# Patient Record
Sex: MALE | Race: WHITE | NOT HISPANIC OR LATINO | Employment: OTHER | URBAN - NONMETROPOLITAN AREA
[De-identification: names, ages, dates, MRNs, and addresses within clinical notes are randomized per-mention and may not be internally consistent; named-entity substitution may affect disease eponyms.]

---

## 2019-10-31 ENCOUNTER — HISTORICAL (OUTPATIENT)
Dept: ADMINISTRATIVE | Facility: HOSPITAL | Age: 64
End: 2019-10-31

## 2020-05-20 ENCOUNTER — HISTORICAL (OUTPATIENT)
Dept: ADMINISTRATIVE | Facility: HOSPITAL | Age: 65
End: 2020-05-20

## 2020-06-09 ENCOUNTER — HISTORICAL (OUTPATIENT)
Dept: ADMINISTRATIVE | Facility: HOSPITAL | Age: 65
End: 2020-06-09

## 2020-12-14 ENCOUNTER — HISTORICAL (OUTPATIENT)
Dept: RADIOLOGY | Facility: HOSPITAL | Age: 65
End: 2020-12-14

## 2020-12-14 LAB
ABS NEUT (OLG): 3.6 X10(3)/MCL (ref 1.5–6.9)
ALBUMIN SERPL-MCNC: 4.3 GM/DL (ref 3.4–4.8)
ALBUMIN/GLOB SERPL: 1.1 RATIO (ref 1.1–2)
ALP SERPL-CCNC: 87 UNIT/L (ref 40–150)
ALT SERPL-CCNC: 16 UNIT/L (ref 0–55)
AST SERPL-CCNC: 22 UNIT/L (ref 5–34)
BASOPHILS # BLD AUTO: 0.1 X10(3)/MCL (ref 0–0.1)
BASOPHILS NFR BLD AUTO: 1 % (ref 0–1)
BILIRUB SERPL-MCNC: 0.5 MG/DL
BILIRUBIN DIRECT+TOT PNL SERPL-MCNC: 0.2 MG/DL (ref 0–0.8)
BILIRUBIN DIRECT+TOT PNL SERPL-MCNC: 0.3 MG/DL (ref 0–0.5)
BUN SERPL-MCNC: 22 MG/DL (ref 8.4–25.7)
CALCIUM SERPL-MCNC: 9.8 MG/DL (ref 8.8–10)
CEA SERPL-MCNC: 1.94 NG/ML (ref 0–3)
CHLORIDE SERPL-SCNC: 101 MMOL/L (ref 98–107)
CO2 SERPL-SCNC: 27 MMOL/L (ref 23–31)
CREAT SERPL-MCNC: 1.51 MG/DL (ref 0.73–1.18)
EOSINOPHIL # BLD AUTO: 0.6 X10(3)/MCL (ref 0–0.6)
EOSINOPHIL NFR BLD AUTO: 9 % (ref 0–5)
ERYTHROCYTE [DISTWIDTH] IN BLOOD BY AUTOMATED COUNT: 13.9 % (ref 11.5–17)
GLOBULIN SER-MCNC: 3.9 GM/DL (ref 2.4–3.5)
GLUCOSE SERPL-MCNC: 153 MG/DL (ref 82–115)
HCT VFR BLD AUTO: 45.4 % (ref 42–52)
HGB BLD-MCNC: 14.6 GM/DL (ref 14–18)
IMM GRANULOCYTES # BLD AUTO: 0.07 10*3/UL (ref 0–0.02)
IMM GRANULOCYTES NFR BLD AUTO: 1.1 % (ref 0–0.43)
LYMPHOCYTES # BLD AUTO: 1.4 X10(3)/MCL (ref 0.5–4.1)
LYMPHOCYTES NFR BLD AUTO: 22 % (ref 15–40)
MCH RBC QN AUTO: 28 PG (ref 27–34)
MCHC RBC AUTO-ENTMCNC: 32 GM/DL (ref 31–36)
MCV RBC AUTO: 86 FL (ref 80–99)
MONOCYTES # BLD AUTO: 0.5 X10(3)/MCL (ref 0–1.1)
MONOCYTES NFR BLD AUTO: 8 % (ref 4–12)
NEUTROPHILS # BLD AUTO: 3.6 X10(3)/MCL (ref 1.5–6.9)
NEUTROPHILS NFR BLD AUTO: 59 % (ref 43–75)
PLATELET # BLD AUTO: 162 X10(3)/MCL (ref 140–400)
PMV BLD AUTO: 10.2 FL (ref 6.8–10)
POC CREATININE: 1.6 MG/DL (ref 0.6–1.3)
POTASSIUM SERPL-SCNC: 4.3 MMOL/L (ref 3.5–5.1)
PROT SERPL-MCNC: 8.2 GM/DL (ref 5.8–7.6)
RBC # BLD AUTO: 5.27 X10(6)/MCL (ref 4.7–6.1)
SODIUM SERPL-SCNC: 141 MMOL/L (ref 136–145)
WBC # SPEC AUTO: 6.1 X10(3)/MCL (ref 4.5–11.5)

## 2020-12-22 ENCOUNTER — HISTORICAL (OUTPATIENT)
Dept: RADIOLOGY | Facility: HOSPITAL | Age: 65
End: 2020-12-22

## 2021-01-07 LAB — CRC RECOMMENDATION EXT: NORMAL

## 2021-04-15 ENCOUNTER — HISTORICAL (OUTPATIENT)
Dept: ADMINISTRATIVE | Facility: HOSPITAL | Age: 66
End: 2021-04-15

## 2021-06-21 ENCOUNTER — HISTORICAL (OUTPATIENT)
Dept: LAB | Facility: HOSPITAL | Age: 66
End: 2021-06-21

## 2021-06-21 LAB
ABS NEUT (OLG): 3.6 X10(3)/MCL (ref 1.5–6.9)
ALBUMIN SERPL-MCNC: 3.8 GM/DL (ref 3.4–4.8)
ALBUMIN/GLOB SERPL: 1.1 RATIO (ref 1.1–2)
ALP SERPL-CCNC: 64 UNIT/L (ref 40–150)
ALT SERPL-CCNC: 7 UNIT/L (ref 0–55)
AST SERPL-CCNC: 23 UNIT/L (ref 5–34)
BASOPHILS # BLD AUTO: 0.1 X10(3)/MCL (ref 0–0.1)
BASOPHILS NFR BLD AUTO: 1 % (ref 0–1)
BILIRUB SERPL-MCNC: 0.4 MG/DL
BILIRUBIN DIRECT+TOT PNL SERPL-MCNC: 0.2 MG/DL (ref 0–0.5)
BILIRUBIN DIRECT+TOT PNL SERPL-MCNC: 0.2 MG/DL (ref 0–0.8)
BUN SERPL-MCNC: 19 MG/DL (ref 8.4–25.7)
CALCIUM SERPL-MCNC: 9.3 MG/DL (ref 8.8–10)
CEA SERPL-MCNC: 2.8 NG/ML (ref 0–3)
CHLORIDE SERPL-SCNC: 107 MMOL/L (ref 98–107)
CO2 SERPL-SCNC: 26 MMOL/L (ref 23–31)
CREAT SERPL-MCNC: 1.49 MG/DL (ref 0.73–1.18)
EOSINOPHIL # BLD AUTO: 0.5 X10(3)/MCL (ref 0–0.6)
EOSINOPHIL NFR BLD AUTO: 8 % (ref 0–5)
ERYTHROCYTE [DISTWIDTH] IN BLOOD BY AUTOMATED COUNT: 13.9 % (ref 11.5–17)
GLOBULIN SER-MCNC: 3.6 GM/DL (ref 2.4–3.5)
GLUCOSE SERPL-MCNC: 126 MG/DL (ref 82–115)
HCT VFR BLD AUTO: 41.5 % (ref 42–52)
HGB BLD-MCNC: 13.7 GM/DL (ref 14–18)
IMM GRANULOCYTES # BLD AUTO: 0.06 10*3/UL (ref 0–0.02)
IMM GRANULOCYTES NFR BLD AUTO: 1 % (ref 0–0.43)
LYMPHOCYTES # BLD AUTO: 1.4 X10(3)/MCL (ref 0.5–4.1)
LYMPHOCYTES NFR BLD AUTO: 23 % (ref 15–40)
MCH RBC QN AUTO: 28 PG (ref 27–34)
MCHC RBC AUTO-ENTMCNC: 33 GM/DL (ref 31–36)
MCV RBC AUTO: 86 FL (ref 80–99)
MONOCYTES # BLD AUTO: 0.5 X10(3)/MCL (ref 0–1.1)
MONOCYTES NFR BLD AUTO: 8 % (ref 4–12)
NEUTROPHILS # BLD AUTO: 3.6 X10(3)/MCL (ref 1.5–6.9)
NEUTROPHILS NFR BLD AUTO: 58 % (ref 43–75)
PLATELET # BLD AUTO: 167 X10(3)/MCL (ref 140–400)
PMV BLD AUTO: 9.6 FL (ref 6.8–10)
POTASSIUM SERPL-SCNC: 4.9 MMOL/L (ref 3.5–5.1)
PROT SERPL-MCNC: 7.4 GM/DL (ref 5.8–7.6)
RBC # BLD AUTO: 4.84 X10(6)/MCL (ref 4.7–6.1)
SODIUM SERPL-SCNC: 141 MMOL/L (ref 136–145)
WBC # SPEC AUTO: 6.2 X10(3)/MCL (ref 4.5–11.5)

## 2021-06-22 ENCOUNTER — HISTORICAL (OUTPATIENT)
Dept: RADIOLOGY | Facility: HOSPITAL | Age: 66
End: 2021-06-22

## 2021-10-21 ENCOUNTER — HISTORICAL (OUTPATIENT)
Dept: LAB | Facility: HOSPITAL | Age: 66
End: 2021-10-21

## 2021-10-21 LAB
ABS NEUT (OLG): 2.8 X10(3)/MCL (ref 1.5–6.9)
ALBUMIN SERPL-MCNC: 3.9 GM/DL (ref 3.4–4.8)
ALBUMIN/GLOB SERPL: 1.3 RATIO (ref 1.1–2)
ALP SERPL-CCNC: 57 UNIT/L (ref 40–150)
ALT SERPL-CCNC: 10 UNIT/L (ref 0–55)
AST SERPL-CCNC: 18 UNIT/L (ref 5–34)
BASOPHILS # BLD AUTO: 0 X10(3)/MCL (ref 0–0.1)
BASOPHILS NFR BLD AUTO: 1 % (ref 0–1)
BILIRUB SERPL-MCNC: 0.5 MG/DL
BILIRUBIN DIRECT+TOT PNL SERPL-MCNC: 0.2 MG/DL (ref 0–0.5)
BILIRUBIN DIRECT+TOT PNL SERPL-MCNC: 0.3 MG/DL (ref 0–0.8)
BUN SERPL-MCNC: 23 MG/DL (ref 8.4–25.7)
CALCIUM SERPL-MCNC: 9.5 MG/DL (ref 8.8–10)
CEA SERPL-MCNC: 2.68 NG/ML (ref 0–3)
CHLORIDE SERPL-SCNC: 105 MMOL/L (ref 98–107)
CO2 SERPL-SCNC: 27 MMOL/L (ref 23–31)
CREAT SERPL-MCNC: 1.53 MG/DL (ref 0.73–1.18)
EOSINOPHIL # BLD AUTO: 0.4 X10(3)/MCL (ref 0–0.6)
EOSINOPHIL NFR BLD AUTO: 7 % (ref 0–5)
ERYTHROCYTE [DISTWIDTH] IN BLOOD BY AUTOMATED COUNT: 14.1 % (ref 11.5–17)
FERRITIN SERPL-MCNC: 118.1 NG/ML (ref 21.81–274.66)
GLOBULIN SER-MCNC: 3.1 GM/DL (ref 2.4–3.5)
GLUCOSE SERPL-MCNC: 128 MG/DL (ref 82–115)
HCT VFR BLD AUTO: 40.9 % (ref 42–52)
HGB BLD-MCNC: 13.1 GM/DL (ref 14–18)
IMM GRANULOCYTES # BLD AUTO: 0.05 10*3/UL (ref 0–0.02)
IMM GRANULOCYTES NFR BLD AUTO: 1 % (ref 0–0.43)
IRON SATN MFR SERPL: 26 % (ref 20–50)
IRON SERPL-MCNC: 61 UG/DL (ref 65–175)
LYMPHOCYTES # BLD AUTO: 1.3 X10(3)/MCL (ref 0.5–4.1)
LYMPHOCYTES NFR BLD AUTO: 26 % (ref 15–40)
MCH RBC QN AUTO: 28 PG (ref 27–34)
MCHC RBC AUTO-ENTMCNC: 32 GM/DL (ref 31–36)
MCV RBC AUTO: 88 FL (ref 80–99)
MONOCYTES # BLD AUTO: 0.5 X10(3)/MCL (ref 0–1.1)
MONOCYTES NFR BLD AUTO: 9 % (ref 4–12)
NEUTROPHILS # BLD AUTO: 2.8 X10(3)/MCL (ref 1.5–6.9)
NEUTROPHILS NFR BLD AUTO: 56 % (ref 43–75)
PLATELET # BLD AUTO: 132 X10(3)/MCL (ref 140–400)
PMV BLD AUTO: 10.6 FL (ref 6.8–10)
POTASSIUM SERPL-SCNC: 5.1 MMOL/L (ref 3.5–5.1)
PROT SERPL-MCNC: 7 GM/DL (ref 5.8–7.6)
RBC # BLD AUTO: 4.67 X10(6)/MCL (ref 4.7–6.1)
SODIUM SERPL-SCNC: 140 MMOL/L (ref 136–145)
TIBC SERPL-MCNC: 177 UG/DL (ref 69–240)
TIBC SERPL-MCNC: 238 UG/DL (ref 250–450)
TRANSFERRIN SERPL-MCNC: 210 MG/DL (ref 163–344)
WBC # SPEC AUTO: 5 X10(3)/MCL (ref 4.5–11.5)

## 2021-10-28 ENCOUNTER — HISTORICAL (OUTPATIENT)
Dept: INFUSION THERAPY | Facility: HOSPITAL | Age: 66
End: 2021-10-28

## 2021-12-28 ENCOUNTER — HISTORICAL (OUTPATIENT)
Dept: LAB | Facility: HOSPITAL | Age: 66
End: 2021-12-28

## 2021-12-28 LAB
ABS NEUT (OLG): 3.3 X10(3)/MCL (ref 1.5–6.9)
ALBUMIN SERPL-MCNC: 4.1 GM/DL (ref 3.4–4.8)
ALBUMIN/GLOB SERPL: 1.3 RATIO (ref 1.1–2)
ALP SERPL-CCNC: 68 UNIT/L (ref 40–150)
ALT SERPL-CCNC: 13 UNIT/L (ref 0–55)
AST SERPL-CCNC: 20 UNIT/L (ref 5–34)
BASOPHILS # BLD AUTO: 0 X10(3)/MCL (ref 0–0.1)
BASOPHILS NFR BLD AUTO: 1 % (ref 0–1)
BILIRUB SERPL-MCNC: 0.5 MG/DL
BILIRUBIN DIRECT+TOT PNL SERPL-MCNC: 0.2 MG/DL (ref 0–0.5)
BILIRUBIN DIRECT+TOT PNL SERPL-MCNC: 0.3 MG/DL (ref 0–0.8)
BUN SERPL-MCNC: 19 MG/DL (ref 8.4–25.7)
CALCIUM SERPL-MCNC: 10 MG/DL (ref 8.7–10.5)
CHLORIDE SERPL-SCNC: 105 MMOL/L (ref 98–107)
CO2 SERPL-SCNC: 27 MMOL/L (ref 23–31)
CREAT SERPL-MCNC: 1.65 MG/DL (ref 0.73–1.18)
EOSINOPHIL # BLD AUTO: 0.4 X10(3)/MCL (ref 0–0.6)
EOSINOPHIL NFR BLD AUTO: 8 % (ref 0–5)
ERYTHROCYTE [DISTWIDTH] IN BLOOD BY AUTOMATED COUNT: 13.9 % (ref 11.5–17)
FOLATE SERPL-MCNC: 4.5 NG/ML (ref 7–31.4)
GLOBULIN SER-MCNC: 3.2 GM/DL (ref 2.4–3.5)
GLUCOSE SERPL-MCNC: 147 MG/DL (ref 82–115)
HCT VFR BLD AUTO: 42.1 % (ref 42–52)
HGB BLD-MCNC: 13.7 GM/DL (ref 14–18)
IMM GRANULOCYTES # BLD AUTO: 0.05 10*3/UL (ref 0–0.02)
IMM GRANULOCYTES NFR BLD AUTO: 0.9 % (ref 0–0.43)
LYMPHOCYTES # BLD AUTO: 1.3 X10(3)/MCL (ref 0.5–4.1)
LYMPHOCYTES NFR BLD AUTO: 23 % (ref 15–40)
MCH RBC QN AUTO: 28 PG (ref 27–34)
MCHC RBC AUTO-ENTMCNC: 32 GM/DL (ref 31–36)
MCV RBC AUTO: 85 FL (ref 80–99)
MONOCYTES # BLD AUTO: 0.5 X10(3)/MCL (ref 0–1.1)
MONOCYTES NFR BLD AUTO: 9 % (ref 4–12)
NEUTROPHILS # BLD AUTO: 3.3 X10(3)/MCL (ref 1.5–6.9)
NEUTROPHILS NFR BLD AUTO: 58 % (ref 43–75)
PLATELET # BLD AUTO: 139 X10(3)/MCL (ref 140–400)
PMV BLD AUTO: 9.7 FL (ref 6.8–10)
POTASSIUM SERPL-SCNC: 5 MMOL/L (ref 3.5–5.1)
PROT SERPL-MCNC: 7.3 GM/DL (ref 5.8–7.6)
RBC # BLD AUTO: 4.97 X10(6)/MCL (ref 4.7–6.1)
SODIUM SERPL-SCNC: 140 MMOL/L (ref 136–145)
VIT B12 SERPL-MCNC: 341 PG/ML (ref 213–816)
WBC # SPEC AUTO: 5.6 X10(3)/MCL (ref 4.5–11.5)

## 2022-02-21 ENCOUNTER — HISTORICAL (OUTPATIENT)
Dept: LAB | Facility: HOSPITAL | Age: 67
End: 2022-02-21

## 2022-02-21 LAB
ABS NEUT (OLG): 10.3 (ref 1.5–6.9)
ALBUMIN SERPL-MCNC: 4 G/DL (ref 3.4–4.8)
ALBUMIN/GLOB SERPL: 1.1 {RATIO} (ref 1.1–2)
ALP SERPL-CCNC: 63 U/L (ref 40–150)
ALT SERPL-CCNC: 13 U/L (ref 0–55)
AST SERPL-CCNC: 15 U/L (ref 5–34)
BASOPHILS # BLD AUTO: 0 10*3/UL (ref 0–0.1)
BASOPHILS NFR BLD AUTO: 0 % (ref 0–1)
BILIRUB SERPL-MCNC: 0.4 MG/DL
BILIRUBIN DIRECT+TOT PNL SERPL-MCNC: 0.2 (ref 0–0.5)
BILIRUBIN DIRECT+TOT PNL SERPL-MCNC: 0.2 (ref 0–0.8)
BUN SERPL-MCNC: 34 MG/DL (ref 8.4–25.7)
CALCIUM SERPL-MCNC: 10.2 MG/DL (ref 8.7–10.5)
CEA SERPL-MCNC: 1.85 NG/ML (ref 0–3)
CHLORIDE SERPL-SCNC: 104 MMOL/L (ref 98–107)
CO2 SERPL-SCNC: 25 MMOL/L (ref 23–31)
CREAT SERPL-MCNC: 1.45 MG/DL (ref 0.73–1.18)
ERYTHROCYTE [DISTWIDTH] IN BLOOD BY AUTOMATED COUNT: 14 % (ref 11.5–17)
FOLATE SERPL-MCNC: 12.6 NG/ML (ref 7–31.4)
GLOBULIN SER-MCNC: 3.5 G/DL (ref 2.4–3.5)
GLUCOSE SERPL-MCNC: 177 MG/DL (ref 82–115)
HCT VFR BLD AUTO: 42.8 % (ref 42–52)
HEMOLYSIS INTERF INDEX SERPL-ACNC: 6
HGB BLD-MCNC: 13.9 G/DL (ref 14–18)
ICTERIC INTERF INDEX SERPL-ACNC: 0
IMM GRANULOCYTES # BLD AUTO: 0.26 10*3/UL (ref 0–0.02)
IMM GRANULOCYTES NFR BLD AUTO: 2.1 % (ref 0–0.43)
LIPEMIC INTERF INDEX SERPL-ACNC: 3
LYMPHOCYTES # BLD AUTO: 1.1 10*3/UL (ref 0.5–4.1)
LYMPHOCYTES NFR BLD AUTO: 9 % (ref 15–40)
MANUAL DIFF? (OHS): NO
MCH RBC QN AUTO: 28 PG (ref 27–34)
MCHC RBC AUTO-ENTMCNC: 32 G/DL (ref 31–36)
MCV RBC AUTO: 86 FL (ref 80–99)
MONOCYTES # BLD AUTO: 0.7 10*3/UL (ref 0–1.1)
MONOCYTES NFR BLD AUTO: 6 % (ref 4–12)
NEUTROPHILS # BLD AUTO: 10.3 10*3/UL (ref 1.5–6.9)
NEUTROPHILS NFR BLD AUTO: 84 % (ref 43–75)
PLATELET # BLD AUTO: 185 10*3/UL (ref 140–400)
PMV BLD AUTO: 10.8 FL (ref 6.8–10)
POTASSIUM SERPL-SCNC: 5 MMOL/L (ref 3.5–5.1)
PROT SERPL-MCNC: 7.5 G/DL (ref 5.8–7.6)
RBC # BLD AUTO: 5 10*6/UL (ref 4.7–6.1)
SODIUM SERPL-SCNC: 139 MMOL/L (ref 136–145)
VIT B12 SERPL-MCNC: 1543 PG/ML (ref 213–816)
WBC # SPEC AUTO: 12.4 10*3/UL (ref 4.5–11.5)

## 2022-02-23 ENCOUNTER — HISTORICAL (OUTPATIENT)
Dept: RADIOLOGY | Facility: HOSPITAL | Age: 67
End: 2022-02-23

## 2022-02-23 ENCOUNTER — HISTORICAL (OUTPATIENT)
Dept: ADMINISTRATIVE | Facility: HOSPITAL | Age: 67
End: 2022-02-23

## 2022-06-29 ENCOUNTER — HISTORICAL (OUTPATIENT)
Dept: ADMINISTRATIVE | Facility: HOSPITAL | Age: 67
End: 2022-06-29

## 2022-07-07 ENCOUNTER — HISTORICAL (OUTPATIENT)
Dept: ADMINISTRATIVE | Facility: HOSPITAL | Age: 67
End: 2022-07-07

## 2022-08-16 ENCOUNTER — DOCUMENTATION ONLY (OUTPATIENT)
Dept: ADMINISTRATIVE | Facility: HOSPITAL | Age: 67
End: 2022-08-16
Payer: MEDICARE

## 2022-08-19 DIAGNOSIS — D64.9 ANEMIA, UNSPECIFIED TYPE: Primary | ICD-10-CM

## 2022-08-19 RX ORDER — FOLIC ACID 1 MG/1
TABLET ORAL
Qty: 30 TABLET | Refills: 3 | Status: SHIPPED | OUTPATIENT
Start: 2022-08-19 | End: 2023-03-21 | Stop reason: SDUPTHER

## 2022-08-19 RX ORDER — LANOLIN ALCOHOL/MO/W.PET/CERES
CREAM (GRAM) TOPICAL
Qty: 30 TABLET | Refills: 3 | Status: SHIPPED | OUTPATIENT
Start: 2022-08-19

## 2022-09-19 ENCOUNTER — LAB VISIT (OUTPATIENT)
Dept: LAB | Facility: HOSPITAL | Age: 67
End: 2022-09-19
Attending: INTERNAL MEDICINE
Payer: MEDICARE

## 2022-09-19 DIAGNOSIS — C18.9 COLON CANCER: Primary | ICD-10-CM

## 2022-09-19 LAB
ALBUMIN SERPL-MCNC: 4.1 GM/DL (ref 3.4–4.8)
ALBUMIN/GLOB SERPL: 1.3 RATIO (ref 1.1–2)
ALP SERPL-CCNC: 64 UNIT/L (ref 40–150)
ALT SERPL-CCNC: 9 UNIT/L (ref 0–55)
AST SERPL-CCNC: 13 UNIT/L (ref 5–34)
BASOPHILS # BLD AUTO: 0.06 X10(3)/MCL (ref 0–0.2)
BASOPHILS NFR BLD AUTO: 1 %
BILIRUBIN DIRECT+TOT PNL SERPL-MCNC: 0.6 MG/DL
BUN SERPL-MCNC: 29 MG/DL (ref 8.4–25.7)
CALCIUM SERPL-MCNC: 9.4 MG/DL (ref 8.8–10)
CEA SERPL-MCNC: 2.6 NG/ML (ref 0–3)
CHLORIDE SERPL-SCNC: 107 MMOL/L (ref 98–107)
CO2 SERPL-SCNC: 26 MMOL/L (ref 23–31)
CREAT SERPL-MCNC: 1.41 MG/DL (ref 0.73–1.18)
EOSINOPHIL # BLD AUTO: 0.63 X10(3)/MCL (ref 0–0.9)
EOSINOPHIL NFR BLD AUTO: 10.7 %
ERYTHROCYTE [DISTWIDTH] IN BLOOD BY AUTOMATED COUNT: 13.6 % (ref 11.5–17)
GFR SERPLBLD CREATININE-BSD FMLA CKD-EPI: 54 MLS/MIN/1.73/M2
GLOBULIN SER-MCNC: 3.1 GM/DL (ref 2.4–3.5)
GLUCOSE SERPL-MCNC: 153 MG/DL (ref 82–115)
HCT VFR BLD AUTO: 41.7 % (ref 42–52)
HGB BLD-MCNC: 13.3 GM/DL (ref 14–18)
IMM GRANULOCYTES # BLD AUTO: 0.03 X10(3)/MCL (ref 0–0.04)
IMM GRANULOCYTES NFR BLD AUTO: 0.5 %
LYMPHOCYTES # BLD AUTO: 1.24 X10(3)/MCL (ref 0.6–4.6)
LYMPHOCYTES NFR BLD AUTO: 21.1 %
MCH RBC QN AUTO: 27.5 PG (ref 27–31)
MCHC RBC AUTO-ENTMCNC: 31.9 MG/DL (ref 33–36)
MCV RBC AUTO: 86.2 FL (ref 80–94)
MONOCYTES # BLD AUTO: 0.48 X10(3)/MCL (ref 0.1–1.3)
MONOCYTES NFR BLD AUTO: 8.1 %
NEUTROPHILS # BLD AUTO: 3.5 X10(3)/MCL (ref 2.1–9.2)
NEUTROPHILS NFR BLD AUTO: 58.6 %
PLATELET # BLD AUTO: 145 X10(3)/MCL (ref 130–400)
PMV BLD AUTO: 10.9 FL (ref 7.4–10.4)
POTASSIUM SERPL-SCNC: 4.9 MMOL/L (ref 3.5–5.1)
PROT SERPL-MCNC: 7.2 GM/DL (ref 5.8–7.6)
RBC # BLD AUTO: 4.84 X10(6)/MCL (ref 4.7–6.1)
SODIUM SERPL-SCNC: 140 MMOL/L (ref 136–145)
WBC # SPEC AUTO: 5.9 X10(3)/MCL (ref 4.5–11.5)

## 2022-09-19 PROCEDURE — 85025 COMPLETE CBC W/AUTO DIFF WBC: CPT

## 2022-09-19 PROCEDURE — 82378 CARCINOEMBRYONIC ANTIGEN: CPT

## 2022-09-19 PROCEDURE — 80053 COMPREHEN METABOLIC PANEL: CPT

## 2022-09-19 PROCEDURE — 36415 COLL VENOUS BLD VENIPUNCTURE: CPT

## 2022-09-21 ENCOUNTER — OFFICE VISIT (OUTPATIENT)
Dept: HEMATOLOGY/ONCOLOGY | Facility: CLINIC | Age: 67
End: 2022-09-21
Payer: MEDICARE

## 2022-09-21 VITALS
SYSTOLIC BLOOD PRESSURE: 94 MMHG | RESPIRATION RATE: 18 BRPM | OXYGEN SATURATION: 98 % | HEART RATE: 64 BPM | TEMPERATURE: 98 F | BODY MASS INDEX: 26.62 KG/M2 | DIASTOLIC BLOOD PRESSURE: 55 MMHG | WEIGHT: 179.69 LBS | HEIGHT: 69 IN

## 2022-09-21 DIAGNOSIS — C18.9 MALIGNANT NEOPLASM OF COLON, UNSPECIFIED PART OF COLON: ICD-10-CM

## 2022-09-21 DIAGNOSIS — R59.0 LOCALIZED ENLARGED LYMPH NODES: Primary | ICD-10-CM

## 2022-09-21 PROCEDURE — 99215 PR OFFICE/OUTPT VISIT, EST, LEVL V, 40-54 MIN: ICD-10-PCS | Mod: S$PBB,,,

## 2022-09-21 PROCEDURE — 99999 PR PBB SHADOW E&M-EST. PATIENT-LVL V: ICD-10-PCS | Mod: PBBFAC,,,

## 2022-09-21 PROCEDURE — 99215 OFFICE O/P EST HI 40 MIN: CPT | Mod: PBBFAC

## 2022-09-21 PROCEDURE — 99215 OFFICE O/P EST HI 40 MIN: CPT | Mod: S$PBB,,,

## 2022-09-21 PROCEDURE — 99999 PR PBB SHADOW E&M-EST. PATIENT-LVL V: CPT | Mod: PBBFAC,,,

## 2022-09-21 RX ORDER — METFORMIN HYDROCHLORIDE 1000 MG/1
1000 TABLET ORAL DAILY
COMMUNITY
Start: 2021-06-24

## 2022-09-21 RX ORDER — FOLIC ACID 1 MG/1
1 TABLET ORAL DAILY
COMMUNITY
Start: 2021-12-29 | End: 2023-03-08 | Stop reason: CLARIF

## 2022-09-21 RX ORDER — HYDROXYZINE HYDROCHLORIDE 25 MG/1
1 TABLET, FILM COATED ORAL DAILY
COMMUNITY
Start: 2022-09-19

## 2022-09-21 RX ORDER — METOPROLOL SUCCINATE 200 MG/1
200 TABLET, EXTENDED RELEASE ORAL DAILY
COMMUNITY
Start: 2022-08-01

## 2022-09-21 RX ORDER — ISOSORBIDE MONONITRATE 30 MG/1
30 TABLET, EXTENDED RELEASE ORAL DAILY
COMMUNITY
Start: 2022-09-15

## 2022-09-21 RX ORDER — SACUBITRIL AND VALSARTAN 97; 103 MG/1; MG/1
1 TABLET, FILM COATED ORAL 2 TIMES DAILY
COMMUNITY
Start: 2022-08-29

## 2022-09-21 RX ORDER — SIMVASTATIN 80 MG/1
80 TABLET, FILM COATED ORAL NIGHTLY
COMMUNITY
End: 2023-03-21

## 2022-09-21 RX ORDER — NAPROXEN SODIUM 220 MG/1
81 TABLET, FILM COATED ORAL DAILY
COMMUNITY

## 2022-09-21 RX ORDER — PANTOPRAZOLE SODIUM 40 MG/1
40 TABLET, DELAYED RELEASE ORAL DAILY
COMMUNITY
Start: 2021-06-24

## 2022-09-21 NOTE — PROGRESS NOTES
[facility] Hematology/Oncology  PROGRESS NOTE      Subjective:         NAME: Eleazar Mark : 1955     67 y.o. male   MRN: [id number]    Referring Doc: No ref. provider found        Chief Complaint:    Chief Complaint   Patient presents with    Colon Cancer     Pt reports some diarrhea and also right foot tingling x2 days       Oncology/Hematology History:  Oncology History    No history exists.   Stage I Colon Cancer   - unresectable polyp on colonoscopy   - R hemicolectomy 19 - adenocarcinoma, grade 1, 0.4cm, margins uninvolved; 0/7 lymph nodes involved; T1N0Mx       Interval History:  Mr. Mark is seen today for follow-up colon cancer, here with his wife, Cinthya. He feels good and denies any new complaints.  He continues with some intermittent diarrhea though this has been fairly consistent since his surgery. He denies any blood in his stools or black stools. He denies any fevers, headache, cough, shortness of breath, chest pain, abdominal pain, swelling. He denies any bleeding whatsoever.  He had some minor abnormalities on CT's, so these were repeated. He has a stable lung nodule and mildly prominent abdominal lymph nodes.     ROS:   GEN: normal without any fever, night sweats or weight loss  HEENT: normal with no HA's, sore throat, stiff neck, changes in vision  CV: normal with no CP, SOB, PND, LEMONS or orthopnea  PULM: normal with no SOB, cough, hemoptysis, sputum or pleuritic pain  GI: normal with no abdominal pain, nausea, vomiting, constipation, diarrhea, melanotic stools, BRBPR, or hematemesis  : normal with no hematuria, dysuria  BREAST: normal with no mass, discharge, pain  SKIN: normal with no rash, erythema, bruising, or swelling    Allergies:  Review of patient's allergies indicates:   Allergen Reactions    Cipro [ciprofloxacin hcl]     Penicillins        Medications:    Current Outpatient Medications:     aspirin 81 MG Chew, Take 81 mg by mouth once daily., Disp: , Rfl:     cyanocobalamin  "(VITAMIN B-12) 1000 MCG tablet, take one tablet by mouth EVERY DAY, Disp: 30 tablet, Rfl: 3    ENTRESTO  mg per tablet, Take 1 tablet by mouth 2 (two) times daily., Disp: , Rfl:     folic acid (FOLVITE) 1 MG tablet, take one tablet by mouth EVERY DAY, Disp: 30 tablet, Rfl: 3    hydrOXYzine HCL (ATARAX) 25 MG tablet, Take 1 tablet by mouth once daily., Disp: , Rfl:     isosorbide mononitrate (IMDUR) 30 MG 24 hr tablet, Take 30 mg by mouth once daily., Disp: , Rfl:     metFORMIN (GLUCOPHAGE) 1000 MG tablet, Take 1,000 mg by mouth once daily., Disp: , Rfl:     metoprolol succinate (TOPROL-XL) 200 MG 24 hr tablet, Take 200 mg by mouth once daily., Disp: , Rfl:     pantoprazole (PROTONIX) 40 MG tablet, Take 40 mg by mouth once daily., Disp: , Rfl:     simvastatin (ZOCOR) 80 MG tablet, Take 80 mg by mouth every evening., Disp: , Rfl:     folic acid (FOLVITE) 1 MG tablet, Take 1 mg by mouth once daily., Disp: , Rfl:     PMHx/PSHx Updates:   Past Medical History:   Diagnosis Date    Colon cancer     Essential (primary) hypertension     Heart disease     Mixed hyperlipidemia      Past Surgical History:   Procedure Laterality Date    COLECTOMY         Family History:  Family History   Problem Relation Age of Onset    Heart failure Mother     Cancer Father     Cancer Brother        Social History:  Social History     Socioeconomic History    Marital status:    Tobacco Use    Smoking status: Former     Types: Cigarettes     Quit date:      Years since quittin.7    Smokeless tobacco: Never   Substance and Sexual Activity    Alcohol use: Yes     Alcohol/week: 6.0 standard drinks     Types: 6 Cans of beer per week    Drug use: Never           Objective:     ECOG SCORE    0 - Fully active-able to carry on all pre-disease performance without restriction         Vitals:  Blood pressure (!) 94/55, pulse 64, temperature 97.7 °F (36.5 °C), resp. rate 18, height 5' 9.02" (1.753 m), weight 81.5 kg (179 lb 10.8 oz), " SpO2 98 %.    Physical Examination:   GEN: no apparent distress, comfortable; AAOx3  HEAD: atraumatic and normocephalic  EYES: no pallor, no icterus, PERRLA  ENT: OMM, no pharyngeal erythema, external ears WNL; no nasal discharge; no thrush  NECK: no masses, thyroid normal, trachea midline, no LAD/LN's, supple  CV: RRR with no murmur; normal pulse; normal S1 and S2; no pedal edema  CHEST: Normal respiratory effort; CTAB; normal breath sounds; no wheeze or crackles  ABDOM: nontender and nondistended; soft; normal bowel sounds; no rebound/guarding  MUSC/Skeletal: ROM normal; no crepitus; joints normal; no deformities or arthropathy  EXTREM: no clubbing, cyanosis, inflammation or swelling  SKIN: no rashes, lesions, ulcers, petechiae or subcutaneous nodules  : no jim  NEURO: grossly intact; motor/sensory WNL; AAOx3; no tremors  PSYCH: normal mood, affect and behavior  LYMPH: normal cervical, supraclavicular, axillary and groin LN's      Labs:   Lab Visit on 09/19/2022   Component Date Value Ref Range Status    Sodium Level 09/19/2022 140  136 - 145 mmol/L Final    Potassium Level 09/19/2022 4.9  3.5 - 5.1 mmol/L Final    Chloride 09/19/2022 107  98 - 107 mmol/L Final    Carbon Dioxide 09/19/2022 26  23 - 31 mmol/L Final    Glucose Level 09/19/2022 153 (H)  82 - 115 mg/dL Final    Blood Urea Nitrogen 09/19/2022 29.0 (H)  8.4 - 25.7 mg/dL Final    Creatinine 09/19/2022 1.41 (H)  0.73 - 1.18 mg/dL Final    Calcium Level Total 09/19/2022 9.4  8.8 - 10.0 mg/dL Final    Protein Total 09/19/2022 7.2  5.8 - 7.6 gm/dL Final    Albumin Level 09/19/2022 4.1  3.4 - 4.8 gm/dL Final    Globulin 09/19/2022 3.1  2.4 - 3.5 gm/dL Final    Albumin/Globulin Ratio 09/19/2022 1.3  1.1 - 2.0 ratio Final    Bilirubin Total 09/19/2022 0.6  <=1.5 mg/dL Final    Alkaline Phosphatase 09/19/2022 64  40 - 150 unit/L Final    Alanine Aminotransferase 09/19/2022 9  0 - 55 unit/L Final    Aspartate Aminotransferase 09/19/2022 13  5 - 34 unit/L  Final    eGFR 09/19/2022 54  mls/min/1.73/m2 Final    Carcinoembryonic Antigen 09/19/2022 2.60  0.00 - 3.00 ng/mL Final    WBC 09/19/2022 5.9  4.5 - 11.5 x10(3)/mcL Final    RBC 09/19/2022 4.84  4.70 - 6.10 x10(6)/mcL Final    Hgb 09/19/2022 13.3 (L)  14.0 - 18.0 gm/dL Final    Hct 09/19/2022 41.7 (L)  42.0 - 52.0 % Final    MCV 09/19/2022 86.2  80.0 - 94.0 fL Final    MCH 09/19/2022 27.5  27.0 - 31.0 pg Final    MCHC 09/19/2022 31.9 (L)  33.0 - 36.0 mg/dL Final    RDW 09/19/2022 13.6  11.5 - 17.0 % Final    Platelet 09/19/2022 145  130 - 400 x10(3)/mcL Final    MPV 09/19/2022 10.9 (H)  7.4 - 10.4 fL Final    Neut % 09/19/2022 58.6  % Final    Lymph % 09/19/2022 21.1  % Final    Mono % 09/19/2022 8.1  % Final    Eos % 09/19/2022 10.7  % Final    Basophil % 09/19/2022 1.0  % Final    Lymph # 09/19/2022 1.24  0.6 - 4.6 x10(3)/mcL Final    Neut # 09/19/2022 3.5  2.1 - 9.2 x10(3)/mcL Final    Mono # 09/19/2022 0.48  0.1 - 1.3 x10(3)/mcL Final    Eos # 09/19/2022 0.63  0 - 0.9 x10(3)/mcL Final    Baso # 09/19/2022 0.06  0 - 0.2 x10(3)/mcL Final    IG# 09/19/2022 0.03  0 - 0.04 x10(3)/mcL Final    IG% 09/19/2022 0.5  % Final   12/14/20 wbc 6.1,  hgb 14.6,  plt 162,  CEA 1.94    Radiology/Diagnostic Studies:  - CT CAP 12/14/20: 1 cm pleural-based nodule anterior lateral right upper lobe. A few suspect mildly prominent lymph nodes at the right hilum and mediastinum measuring just over 1 cm. No prior comparison available. A few enlarged lymph nodes in the periportal pericaval region measuring up to 2 x 1 cm. Borderline hepatosplenomegaly.   - PET 12/22/20: right upper lobe subpleural nodule demonstrates no significant FDG activity. Prominent periportal lymph nodes demonstrate only mild FDG activity. No convincing PET evidence of metastatic disease.   - CT CAP 6/22/21: 1 cm pleural-based nodule again evident at the anterior lateral right upper lung. A few mildly prominent lymph nodes at the right hilum and mediastinum again  evident a few enlarged lymph nodes again identified in the periportal and pericaval region.  CT A/P 2/23/22  1. A few mildly prominent lymph nodes again seen within the  pericaval/periportal region 2. Lumbar spondylosis and mild osteopenia 3. Small right fatty inguinal hernia 4. Atherosclerosis 5. Postsurgical changes again evident at the right 2 1  CT Chest 2/23/22 1. 1 cm pleural-based nodule again evident at the anterior lateral  right upper lobe which has a similar appearance to the prior exam 2. Thoracic spondylosis 3. Atherosclerosis    I have reviewed all available lab results and radiology reports.    Assessment/Plan:   1.Stage I Colon Cancer of cecum - No adjuvant chemotherapy indicated. CT shows stable pulmonary nodule & nonspecific nodes. Will monitor closely. CEA normal. Plan to repeat in February 2022   He is aware children need to start colonoscopy at 41yo.   Discussed genetics counseling - they do not know specific types of cancer.     2. Pulmonary nodule- not hypermetabolic on PET, stable on CT, will cont to monitor.     3. Anemia -iron studies adequate. Improved    4. Thrombocytopenia, mild. Resolved. Will check a B12 and folate with his next labs, will get repeat CBC in 2 months.    Pt with stage I colon cancer, doing well. Dr. Rodas discussed with him that the minor abnormalities on CT have been very stable. Generally routine scans are not typical for stage I disease, but will consider repeating  scans in one year given minor abnormalities.     PLAN:  RTC 6 months with MD for CT CAP  CBC,CMP, CEA before return to clinic       Pt advised to call in the interim if anything concerning including but not limited to - fevers, night sweats, bleeding, new or unexplained pain or weight loss, changes in BM.     Discussion:     I have explained all of the above in detail and the patient understands all of the current recommendation(s). I have answered all of their questions to the best of my ability and to  their complete satisfaction.   The patient is to continue with the current management plan.            Electronically signed by Lovely Nash NP

## 2022-10-13 ENCOUNTER — TELEPHONE (OUTPATIENT)
Dept: HEMATOLOGY/ONCOLOGY | Facility: CLINIC | Age: 67
End: 2022-10-13
Payer: MEDICARE

## 2022-10-13 NOTE — TELEPHONE ENCOUNTER
----- Message from Hayley Gustafson sent at 10/12/2022  6:50 PM CDT -----  Regarding: CT Scans  CT CAP scheduled 03/16/23 @ [facility] w/ 8:00 arrival  ##NPO##    Unable to reach patient -- no answer & no v/m -- scanned & mailed appt letter  ----- Message -----  From: Lovely Nash NP  Sent: 9/21/2022   9:31 AM CDT  To: Hayley Gustafson    CT CAP ordered prior to return 3/2022

## 2023-02-20 ENCOUNTER — HOSPITAL ENCOUNTER (OUTPATIENT)
Dept: RADIOLOGY | Facility: HOSPITAL | Age: 68
Discharge: HOME | End: 2023-02-20
Attending: FAMILY MEDICINE
Payer: MEDICARE

## 2023-02-20 DIAGNOSIS — M25.552 BILATERAL HIP PAIN: ICD-10-CM

## 2023-02-20 DIAGNOSIS — M25.551 BILATERAL HIP PAIN: ICD-10-CM

## 2023-02-20 PROCEDURE — 73521 X-RAY EXAM HIPS BI 2 VIEWS: CPT | Mod: TC

## 2023-03-08 ENCOUNTER — HOSPITAL ENCOUNTER (OUTPATIENT)
Dept: PREADMISSION TESTING | Facility: HOSPITAL | Age: 68
Discharge: HOME | End: 2023-03-08
Payer: MEDICARE

## 2023-03-08 VITALS — WEIGHT: 179 LBS | BODY MASS INDEX: 26.51 KG/M2 | HEIGHT: 69 IN

## 2023-03-08 DIAGNOSIS — Z85.038 PERSONAL HISTORY OF COLON CANCER: Primary | ICD-10-CM

## 2023-03-08 RX ORDER — DAPAGLIFLOZIN 5 MG/1
5 TABLET, FILM COATED ORAL DAILY
COMMUNITY

## 2023-03-08 RX ORDER — CETIRIZINE HYDROCHLORIDE 10 MG/1
10 TABLET ORAL DAILY
COMMUNITY

## 2023-03-08 RX ORDER — FLUTICASONE PROPIONATE 50 UG/1
POWDER, METERED RESPIRATORY (INHALATION)
COMMUNITY

## 2023-03-08 NOTE — DISCHARGE INSTRUCTIONS

## 2023-03-16 ENCOUNTER — HOSPITAL ENCOUNTER (OUTPATIENT)
Dept: RADIOLOGY | Facility: HOSPITAL | Age: 68
Discharge: HOME | End: 2023-03-16
Payer: MEDICARE

## 2023-03-16 DIAGNOSIS — R59.0 LOCALIZED ENLARGED LYMPH NODES: ICD-10-CM

## 2023-03-16 PROCEDURE — 74177 CT ABD & PELVIS W/CONTRAST: CPT | Mod: TC

## 2023-03-16 PROCEDURE — 71260 CT THORAX DX C+: CPT | Mod: TC

## 2023-03-16 PROCEDURE — 25500020 PHARM REV CODE 255

## 2023-03-16 RX ADMIN — IOPAMIDOL 100 ML: 755 INJECTION, SOLUTION INTRAVENOUS at 09:03

## 2023-03-16 RX ADMIN — DIATRIZOATE MEGLUMINE AND DIATRIZOATE SODIUM 30 ML: 660; 100 LIQUID ORAL; RECTAL at 10:03

## 2023-03-20 NOTE — PROGRESS NOTES
[facility] Hematology/Oncology  PROGRESS NOTE      Subjective:         NAME: Eleazar Mark : 1955     67 y.o. male   MRN: [id number]    Referring Doc: No ref. provider found        Chief Complaint:    Chief Complaint   Patient presents with    Colon Cancer     Pt reports ongoing diarrhea that is controlled. He also states he is going to have a colonoscopy on  with dr. Jose gee.       Oncology/Hematology History:  Oncology History    No history exists.   Stage I Colon Cancer   - unresectable polyp on colonoscopy   - R hemicolectomy 19 - adenocarcinoma, grade 1, 0.4cm, margins uninvolved; 0/7 lymph nodes involved; T1N0Mx       Interval History:  Mr. Mark is seen today for follow-up to go over CT and labs, he is present with his wife. He feels good and denies any new complaints.  He continues with some intermittent diarrhea though this has been fairly consistent since his surgery. He denies any blood in his stools or black stools. He denies any fevers, headache, cough, shortness of breath, chest pain, abdominal pain, swelling. He denies any bleeding whatsoever.  He has a scheduled colonoscopy for 3/24/23 with Dr. Jose Carmen. Patient will be seeing Dr. Hall for some ongoing hip issues. He has a stable lung nodule and mildly prominent abdominal lymph nodes.     ROS:   GEN: normal without any fever, night sweats or weight loss  HEENT: normal with no HA's, sore throat, stiff neck, changes in vision  CV: normal with no CP, SOB, PND, LEMONS or orthopnea  PULM: normal with no SOB, cough, hemoptysis, sputum or pleuritic pain  GI: normal with no abdominal pain, nausea, vomiting, constipation, diarrhea, melanotic stools, BRBPR, or hematemesis  : normal with no hematuria, dysuria  BREAST: normal with no mass, discharge, pain  SKIN: normal with no rash, erythema, bruising, or swelling    Allergies:  Review of patient's allergies indicates:   Allergen Reactions    Cipro [ciprofloxacin hcl]     Penicillins         Medications:    Current Outpatient Medications:     atorvastatin (LIPITOR) 80 MG tablet, Take 80 mg by mouth once daily., Disp: , Rfl:     cetirizine (ZYRTEC) 10 MG tablet, Take 10 mg by mouth once daily., Disp: , Rfl:     cyanocobalamin (VITAMIN B-12) 1000 MCG tablet, take one tablet by mouth EVERY DAY, Disp: 30 tablet, Rfl: 3    dapagliflozin (FARXIGA) 5 mg Tab tablet, Take 5 mg by mouth once daily., Disp: , Rfl:     dupilumab (DUPIXENT PEN) 200 mg/1.14 mL PnIj, Inject 200 mg into the skin every 14 (fourteen) days., Disp: , Rfl:     ENTRESTO  mg per tablet, Take 1 tablet by mouth 2 (two) times daily., Disp: , Rfl:     fluticasone propionate (FLOVENT DISKUS) 50 mcg/actuation DsDv, Inhale into the lungs. Controller, Disp: , Rfl:     hydrOXYzine HCL (ATARAX) 25 MG tablet, Take 1 tablet by mouth once daily., Disp: , Rfl:     isosorbide mononitrate (IMDUR) 30 MG 24 hr tablet, Take 30 mg by mouth once daily., Disp: , Rfl:     metFORMIN (GLUCOPHAGE) 1000 MG tablet, Take 1,000 mg by mouth once daily., Disp: , Rfl:     metoprolol succinate (TOPROL-XL) 200 MG 24 hr tablet, Take 200 mg by mouth once daily., Disp: , Rfl:     pantoprazole (PROTONIX) 40 MG tablet, Take 40 mg by mouth once daily., Disp: , Rfl:     aspirin 81 MG Chew, Take 81 mg by mouth once daily., Disp: , Rfl:     folic acid (FOLVITE) 1 MG tablet, Take 1 tablet (1,000 mcg total) by mouth once daily., Disp: 30 tablet, Rfl: 3    PMHx/PSHx Updates:   Past Medical History:   Diagnosis Date    Colon cancer     Essential (primary) hypertension     Heart disease     Mixed hyperlipidemia      Past Surgical History:   Procedure Laterality Date    COLON RESECTION      HAND SURGERY Right        Family History:  Family History   Problem Relation Age of Onset    Heart failure Mother     Cancer Father     Cancer Brother        Social History:  Social History     Socioeconomic History    Marital status:    Tobacco Use    Smoking status: Former     Types:  "Cigarettes     Quit date:      Years since quittin.2    Smokeless tobacco: Never   Substance and Sexual Activity    Alcohol use: Yes     Alcohol/week: 6.0 standard drinks     Types: 6 Cans of beer per week    Drug use: Never           Objective:     ECOG SCORE    0 - Fully active-able to carry on all pre-disease performance without restriction         Vitals:  Blood pressure 113/68, pulse 65, temperature 97.5 °F (36.4 °C), resp. rate 18, height 5' 9" (1.753 m), weight 83.9 kg (184 lb 15.5 oz), SpO2 96 %.    Physical Examination:   GEN: no apparent distress, comfortable; AAOx3  HEAD: atraumatic and normocephalic  EYES: no pallor, no icterus, PERRLA  ENT: OMM, no pharyngeal erythema, external ears WNL; no nasal discharge; no thrush  NECK: no masses, thyroid normal, trachea midline, no LAD/LN's, supple  CV: RRR with no murmur; normal pulse; normal S1 and S2; no pedal edema  CHEST: Normal respiratory effort; CTAB; normal breath sounds; no wheeze or crackles  ABDOM: nontender and nondistended; soft; normal bowel sounds; no rebound/guarding  MUSC/Skeletal: ROM normal; no crepitus; joints normal; no deformities or arthropathy  EXTREM: no clubbing, cyanosis, inflammation or swelling  SKIN: no rashes, lesions, ulcers, petechiae or subcutaneous nodules  : no jim  NEURO: grossly intact; motor/sensory WNL; AAOx3; no tremors, Hard of hearing  PSYCH: normal mood, affect and behavior  LYMPH: normal cervical, supraclavicular, axillary and groin LN's      Labs:   Lab Visit on 2023   Component Date Value Ref Range Status    Sodium Level 2023 141  136 - 145 mmol/L Final    Potassium Level 2023 4.4  3.5 - 5.1 mmol/L Final    Chloride 2023 107  98 - 107 mmol/L Final    Carbon Dioxide 2023 24  23 - 31 mmol/L Final    Glucose Level 2023 230 (H)  82 - 115 mg/dL Final    Blood Urea Nitrogen 2023 21.0  8.4 - 25.7 mg/dL Final    Creatinine 2023 1.52 (H)  0.73 - 1.18 mg/dL Final    " Calcium Level Total 03/16/2023 10.1 (H)  8.8 - 10.0 mg/dL Final    Protein Total 03/16/2023 7.1  5.8 - 7.6 gm/dL Final    Albumin Level 03/16/2023 3.9  3.4 - 4.8 g/dL Final    Globulin 03/16/2023 3.2  2.4 - 3.5 gm/dL Final    Albumin/Globulin Ratio 03/16/2023 1.2  1.1 - 2.0 ratio Final    Bilirubin Total 03/16/2023 0.4  <=1.5 mg/dL Final    Alkaline Phosphatase 03/16/2023 62  40 - 150 unit/L Final    Alanine Aminotransferase 03/16/2023 11  0 - 55 unit/L Final    Aspartate Aminotransferase 03/16/2023 16  5 - 34 unit/L Final    eGFR 03/16/2023 50  mls/min/1.73/m2 Final    Carcinoembryonic Antigen 03/16/2023 2.96  0.00 - 3.00 ng/mL Final    WBC 03/16/2023 4.7  4.5 - 11.5 x10(3)/mcL Final    RBC 03/16/2023 4.63 (L)  4.70 - 6.10 x10(6)/mcL Final    Hgb 03/16/2023 12.9 (L)  14.0 - 18.0 g/dL Final    Hct 03/16/2023 40.0 (L)  42.0 - 52.0 % Final    MCV 03/16/2023 86.4  80.0 - 94.0 fL Final    MCH 03/16/2023 27.9  pg Final    MCHC 03/16/2023 32.3 (L)  33.0 - 36.0 g/dL Final    RDW 03/16/2023 13.9  11.5 - 17.0 % Final    Platelet 03/16/2023 131  130 - 400 x10(3)/mcL Final    MPV 03/16/2023 10.7 (H)  7.4 - 10.4 fL Final    Neut % 03/16/2023 59.3  % Final    Lymph % 03/16/2023 23.2  % Final    Mono % 03/16/2023 7.9  % Final    Eos % 03/16/2023 8.1  % Final    Basophil % 03/16/2023 1.1  % Final    Lymph # 03/16/2023 1.09  0.6 - 4.6 x10(3)/mcL Final    Neut # 03/16/2023 2.79  2.1 - 9.2 x10(3)/mcL Final    Mono # 03/16/2023 0.37  0.1 - 1.3 x10(3)/mcL Final    Eos # 03/16/2023 0.38  0 - 0.9 x10(3)/mcL Final    Baso # 03/16/2023 0.05  0 - 0.2 x10(3)/mcL Final    IG# 03/16/2023 0.02  0 - 0.04 x10(3)/mcL Final    IG% 03/16/2023 0.4  % Final     12/14/20 wbc 6.1,  hgb 14.6,  plt 162,  CEA 1.94  3/16/23 wbc 4.7, hgb 12.9, plt 131, CEA 2.96    Radiology/Diagnostic Studies:  - CT CAP 12/14/20: 1 cm pleural-based nodule anterior lateral right upper lobe. A few suspect mildly prominent lymph nodes at the right hilum and mediastinum  measuring just over 1 cm. No prior comparison available. A few enlarged lymph nodes in the periportal pericaval region measuring up to 2 x 1 cm. Borderline hepatosplenomegaly.   - PET 12/22/20: right upper lobe subpleural nodule demonstrates no significant FDG activity. Prominent periportal lymph nodes demonstrate only mild FDG activity. No convincing PET evidence of metastatic disease.   - CT CAP 6/22/21: 1 cm pleural-based nodule again evident at the anterior lateral right upper lung. A few mildly prominent lymph nodes at the right hilum and mediastinum again evident a few enlarged lymph nodes again identified in the periportal and pericaval region.  CT A/P 2/23/22  1. A few mildly prominent lymph nodes again seen within the  pericaval/periportal region 2. Lumbar spondylosis and mild osteopenia 3. Small right fatty inguinal hernia 4. Atherosclerosis 5. Postsurgical changes again evident at the right 2 1  CT Chest 2/23/22 1. 1 cm pleural-based nodule again evident at the anterior lateral  right upper lobe which has a similar appearance to the prior exam 2. Thoracic spondylosis 3. Atherosclerosis    CT CAP 3/16/23  1. 1 cm pleural based nodule again evident at the anterolateral right upper lobe with small punctate calcification  2. A few enlarged lymph nodes again identified within the periportal pericaval region which have a similar appearance to the prior exam  3. Postsurgical changes right colon  4. Thoracolumbar spondylosis  5. Atherosclerosis    I have reviewed all available lab results and radiology reports.    Assessment/Plan:   1.Stage I Colon Cancer of cecum - No adjuvant chemotherapy indicated. CT shows stable pulmonary nodule & nonspecific nodes. Will monitor closely. CEA normal.   He is aware children need to start colonoscopy at 41yo.   Discussed genetics counseling - they do not know specific types of cancer.     2. Pulmonary nodule- stable on CT, will cont to monitor.     3. Anemia - stable    Pt with  stage I colon cancer, doing well. Dr. Rodas discussed with him that the minor abnormalities on CT have been very stable. Generally routine scans are not typical for stage I disease, but will consider repeating  scans in one year given minor abnormalities.     PLAN:  RTC 6 months with MD for CT CAP  CBC,CMP, CEA before return to clinic       Pt advised to call in the interim if anything concerning including but not limited to - fevers, night sweats, bleeding, new or unexplained pain or weight loss, changes in BM.     Discussion:     I have explained all of the above in detail and the patient understands all of the current recommendation(s). I have answered all of their questions to the best of my ability and to their complete satisfaction.   The patient is to continue with the current management plan.            Electronically signed by Ashley Burnett MD

## 2023-03-21 ENCOUNTER — OFFICE VISIT (OUTPATIENT)
Dept: HEMATOLOGY/ONCOLOGY | Facility: CLINIC | Age: 68
End: 2023-03-21
Payer: MEDICARE

## 2023-03-21 VITALS
OXYGEN SATURATION: 96 % | DIASTOLIC BLOOD PRESSURE: 68 MMHG | WEIGHT: 184.94 LBS | HEART RATE: 65 BPM | SYSTOLIC BLOOD PRESSURE: 113 MMHG | HEIGHT: 69 IN | RESPIRATION RATE: 18 BRPM | TEMPERATURE: 98 F | BODY MASS INDEX: 27.39 KG/M2

## 2023-03-21 DIAGNOSIS — D64.9 ANEMIA, UNSPECIFIED TYPE: ICD-10-CM

## 2023-03-21 DIAGNOSIS — C18.0 CANCER OF CECUM: Primary | ICD-10-CM

## 2023-03-21 DIAGNOSIS — C18.0 CANCER OF CECUM: ICD-10-CM

## 2023-03-21 PROCEDURE — 99214 PR OFFICE/OUTPT VISIT, EST, LEVL IV, 30-39 MIN: ICD-10-PCS | Mod: S$PBB,,, | Performed by: INTERNAL MEDICINE

## 2023-03-21 PROCEDURE — 99999 PR PBB SHADOW E&M-EST. PATIENT-LVL V: ICD-10-PCS | Mod: PBBFAC,,, | Performed by: INTERNAL MEDICINE

## 2023-03-21 PROCEDURE — 99214 OFFICE O/P EST MOD 30 MIN: CPT | Mod: S$PBB,,, | Performed by: INTERNAL MEDICINE

## 2023-03-21 PROCEDURE — 99999 PR PBB SHADOW E&M-EST. PATIENT-LVL V: CPT | Mod: PBBFAC,,, | Performed by: INTERNAL MEDICINE

## 2023-03-21 PROCEDURE — 99215 OFFICE O/P EST HI 40 MIN: CPT | Mod: PBBFAC | Performed by: INTERNAL MEDICINE

## 2023-03-21 RX ORDER — FOLIC ACID 1 MG/1
1000 TABLET ORAL DAILY
Qty: 30 TABLET | Refills: 3 | Status: SHIPPED | OUTPATIENT
Start: 2023-03-21 | End: 2023-07-24 | Stop reason: SDUPTHER

## 2023-03-21 RX ORDER — DUPILUMAB 200 MG/1.14ML
200 INJECTION, SOLUTION SUBCUTANEOUS
COMMUNITY

## 2023-03-21 RX ORDER — ATORVASTATIN CALCIUM 80 MG/1
80 TABLET, FILM COATED ORAL DAILY
COMMUNITY

## 2023-03-23 ENCOUNTER — ANESTHESIA EVENT (OUTPATIENT)
Dept: GASTROENTEROLOGY | Facility: HOSPITAL | Age: 68
End: 2023-03-23
Payer: MEDICARE

## 2023-03-23 NOTE — ANESTHESIA PREPROCEDURE EVALUATION
03/23/2023  Eleazar Mark is a 67 y.o., male.      Pre-op Assessment    I have reviewed the Patient Summary Reports.     I have reviewed the Nursing Notes. I have reviewed the NPO Status.   I have reviewed the Medications.     Review of Systems  Anesthesia Hx:  No problems with previous Anesthesia  Denies Family Hx of Anesthesia complications.   Denies Personal Hx of Anesthesia complications.   Social:  Former Smoker    Hematology/Oncology:  Hematology Normal       Colon Current/Recent Cancer.  --  Cancer in past history:  surgery    EENT/Dental:EENT/Dental Normal   Cardiovascular:   Exercise tolerance: good Pacemaker Hypertension CABG/stent hyperlipidemia    Pulmonary:   Shortness of breath    Renal/:  Renal/ Normal     Hepatic/GI:  Hepatic/GI Normal    Musculoskeletal:  Musculoskeletal Normal    Neurological:  Neurology Normal    Endocrine:   Diabetes, well controlled, type 2    Dermatological:  Skin Normal    Psych:  Psychiatric Normal           Physical Exam  General: Well nourished, Cooperative, Alert and Oriented    Airway:  Mallampati: II / II  Mouth Opening: Normal  TM Distance: Normal  Tongue: Normal  Neck ROM: Normal ROM    Dental:  Intact        Anesthesia Plan  Type of Anesthesia, risks & benefits discussed:    Anesthesia Type: MAC  Intra-op Monitoring Plan: Standard ASA Monitors  Post Op Pain Control Plan: multimodal analgesia  Induction:  IV  Informed Consent: Informed consent signed with the Patient and all parties understand the risks and agree with anesthesia plan.  All questions answered. Patient consented to blood products? Yes  ASA Score: 2  Day of Surgery Review of History & Physical: H&P Update referred to the surgeon/provider.I have interviewed and examined the patient. I have reviewed the patient's H&P dated: There are no significant changes.     Ready For Surgery From Anesthesia  Perspective.     .

## 2023-03-24 ENCOUNTER — HOSPITAL ENCOUNTER (OUTPATIENT)
Dept: GASTROENTEROLOGY | Facility: HOSPITAL | Age: 68
Discharge: HOME | End: 2023-03-24
Attending: FAMILY MEDICINE
Payer: MEDICARE

## 2023-03-24 ENCOUNTER — ANESTHESIA (OUTPATIENT)
Dept: GASTROENTEROLOGY | Facility: HOSPITAL | Age: 68
End: 2023-03-24
Payer: MEDICARE

## 2023-03-24 VITALS
OXYGEN SATURATION: 99 % | RESPIRATION RATE: 18 BRPM | BODY MASS INDEX: 27.25 KG/M2 | HEART RATE: 54 BPM | SYSTOLIC BLOOD PRESSURE: 104 MMHG | WEIGHT: 184 LBS | HEIGHT: 69 IN | DIASTOLIC BLOOD PRESSURE: 61 MMHG | TEMPERATURE: 97 F

## 2023-03-24 DIAGNOSIS — Z85.038 PERSONAL HISTORY OF COLON CANCER: ICD-10-CM

## 2023-03-24 LAB — POCT GLUCOSE: 136 MG/DL (ref 70–110)

## 2023-03-24 PROCEDURE — D9220A PRA ANESTHESIA: ICD-10-PCS | Mod: PT,,, | Performed by: NURSE ANESTHETIST, CERTIFIED REGISTERED

## 2023-03-24 PROCEDURE — 63600175 PHARM REV CODE 636 W HCPCS: Performed by: NURSE ANESTHETIST, CERTIFIED REGISTERED

## 2023-03-24 PROCEDURE — 82962 GLUCOSE BLOOD TEST: CPT

## 2023-03-24 PROCEDURE — 37000008 HC ANESTHESIA 1ST 15 MINUTES

## 2023-03-24 PROCEDURE — 27201423 OPTIME MED/SURG SUP & DEVICES STERILE SUPPLY

## 2023-03-24 PROCEDURE — D9220A PRA ANESTHESIA: Mod: PT,,, | Performed by: NURSE ANESTHETIST, CERTIFIED REGISTERED

## 2023-03-24 PROCEDURE — 45380 COLONOSCOPY AND BIOPSY: CPT | Mod: PT | Performed by: FAMILY MEDICINE

## 2023-03-24 PROCEDURE — 25000003 PHARM REV CODE 250: Performed by: NURSE ANESTHETIST, CERTIFIED REGISTERED

## 2023-03-24 PROCEDURE — S5010 5% DEXTROSE AND 0.45% SALINE: HCPCS | Performed by: FAMILY MEDICINE

## 2023-03-24 PROCEDURE — 37000009 HC ANESTHESIA EA ADD 15 MINS

## 2023-03-24 PROCEDURE — 25000003 PHARM REV CODE 250: Performed by: FAMILY MEDICINE

## 2023-03-24 RX ORDER — PROPOFOL 10 MG/ML
VIAL (ML) INTRAVENOUS
Status: DISCONTINUED | OUTPATIENT
Start: 2023-03-24 | End: 2023-03-24

## 2023-03-24 RX ORDER — DEXTROSE MONOHYDRATE AND SODIUM CHLORIDE 5; .45 G/100ML; G/100ML
INJECTION, SOLUTION INTRAVENOUS CONTINUOUS
Status: DISCONTINUED | OUTPATIENT
Start: 2023-03-24 | End: 2023-03-25 | Stop reason: HOSPADM

## 2023-03-24 RX ORDER — LIDOCAINE HYDROCHLORIDE 20 MG/ML
INJECTION INTRAVENOUS
Status: DISCONTINUED | OUTPATIENT
Start: 2023-03-24 | End: 2023-03-24

## 2023-03-24 RX ADMIN — LIDOCAINE HYDROCHLORIDE 100 MG: 20 INJECTION, SOLUTION INTRAVENOUS at 08:03

## 2023-03-24 RX ADMIN — PROPOFOL 100 MG: 10 INJECTION, EMULSION INTRAVENOUS at 08:03

## 2023-03-24 RX ADMIN — DEXTROSE AND SODIUM CHLORIDE: 5; 450 INJECTION, SOLUTION INTRAVENOUS at 06:03

## 2023-03-24 NOTE — PLAN OF CARE
Discharge instructions reviewed with patient/family, copy given.  Discharged per ambulatory to car accompanied by family.

## 2023-03-24 NOTE — ANESTHESIA POSTPROCEDURE EVALUATION
Anesthesia Post Evaluation    Patient: Eleazar D Mohall    Procedure(s) Performed: * No procedures listed *    Final Anesthesia Type: MAC      Patient location during evaluation: OPS                    Vitals Value Taken Time   BP 99/62 03/24/23 0602   Temp 35.7 °C (96.3 °F) 03/24/23 0602   Pulse 64 03/24/23 0602   Resp 18 03/24/23 0602   SpO2 99 % 03/24/23 0602         No case tracking events are documented in the log.      Pain/Westley Score: No data recorded

## 2023-03-24 NOTE — BRIEF OP NOTE
Procedure Date  3/24/23    Impression  Overall Impression: See Below.  Pt. Did well and will be discharged home.     Recommendation  There are no screening program recommendations for this study.    Indication  Personal history of colon cancer    Providers  [facility] Natalie Technician   ADAL CADENA Visitor   Aaron Woods RN Circulator   Jose Eduardo Carmen III, MD Proceduralist   Naresh Mei CRNA CRNA       Medications  General anesthesia - See anesthesia record.    Preprocedure  A history and physical has been performed, and patient medication allergies have been reviewed. The patient's tolerance of previous anesthesia has been reviewed. The risks and benefits of the procedure and the sedation options and risks were discussed with the patient. All questions were answered and informed consent obtained.        Details of the Procedure  The patient's heart rate, blood pressure, level of consciousness, respirations, oxygen, ECG and ETCO2 were monitored throughout the procedure. A digital rectal exam was performed. A perianal exam was performed. The scope was introduced through the anus and advanced to the terminal ileum. Retroflexion was performed in the rectum. The patient's estimated blood loss was minimal (<5 mL). The procedure was not difficult. The patient tolerated the procedure well. There were no apparent complications.     Scope: Colonoscope  Scope Serial: [id number]    Events  Procedure Events   Event Event Time     Procedure Events   Event Event Time   ENDO SCOPE IN TIME 3/24/2023  8:07 AM   ENDO SCOPE OUT TIME 3/24/2023  8:23 AM     CECAL WITHDRAWAL TIME:     Findings  One polyp measuring 5-9 mm in the descending colon 60 cm from the anal verge; performed cold forceps biopsy  
6

## 2023-03-24 NOTE — ANESTHESIA POSTPROCEDURE EVALUATION
Anesthesia Post Evaluation    Patient: Eleazar D Jas    Procedure(s) Performed: * No procedures listed *    Final Anesthesia Type: MAC      Patient location during evaluation: OPS  Patient participation: Yes- Able to Participate  Level of consciousness: awake and alert, awake and oriented  Post-procedure vital signs: reviewed and stable  Pain management: adequate  Airway patency: patent  LIBERTY mitigation strategies: Preoperative initiation of continuous positive airway pressure (CPAP) or non-invasive positive pressure ventilation (NIPPV)  PONV status at discharge: No PONV  Anesthetic complications: no      Cardiovascular status: blood pressure returned to baseline  Respiratory status: unassisted, room air and spontaneous ventilation  Hydration status: euvolemic  Follow-up not needed.          Vitals Value Taken Time   BP 99/62 03/24/23 0602   Temp 35.7 °C (96.3 °F) 03/24/23 0602   Pulse 64 03/24/23 0602   Resp 18 03/24/23 0602   SpO2 99 % 03/24/23 0602         No case tracking events are documented in the log.      Pain/Westley Score: No data recorded

## 2023-03-24 NOTE — PLAN OF CARE
Patient called for someone to check his BM. Clear water in the commode with green mucus in the bottom.

## 2023-03-24 NOTE — PLAN OF CARE
Received from Scope Room per stretcher in good condition, sleepy but easy to arouse.  Report per JAN Hernandez RN and patient has passed gas and a polyp was sent off.

## 2023-03-24 NOTE — ANESTHESIA POSTPROCEDURE EVALUATION
Anesthesia Post Evaluation    Patient: Eleazar D [place]    Procedure(s) Performed: * No procedures listed *    Final Anesthesia Type: MAC      Patient location during evaluation: OPS                    Vitals Value Taken Time   BP 99/62 03/24/23 0602   Temp 35.7 °C (96.3 °F) 03/24/23 0602   Pulse 64 03/24/23 0602   Resp 18 03/24/23 0602   SpO2 99 % 03/24/23 0602         No case tracking events are documented in the log.      Pain/Westley Score: No data recorded

## 2023-03-24 NOTE — DISCHARGE INSTRUCTIONS
Follow-up with Dr Jose Carmen as needed.  You will receive a call from Dr Carmen/office with results of your polyp.    Diet: as tolerated    Activity:  decrease activity today, no driving today, resume all activity tomorrow    Notify MD:  increased swelling of abdomen, excessive nausea/vomiting, excessive bright red bleeding from rectum    Medications:  continue your home medications. Keep a list of your home medications at all times for emergencies.

## 2023-07-24 DIAGNOSIS — D64.9 ANEMIA, UNSPECIFIED TYPE: ICD-10-CM

## 2023-07-24 RX ORDER — FOLIC ACID 1 MG/1
1000 TABLET ORAL DAILY
Qty: 30 TABLET | Refills: 3 | Status: SHIPPED | OUTPATIENT
Start: 2023-07-24

## 2023-09-14 ENCOUNTER — TELEPHONE (OUTPATIENT)
Dept: HEMATOLOGY/ONCOLOGY | Facility: CLINIC | Age: 68
End: 2023-09-14
Payer: MEDICARE

## 2023-09-14 NOTE — TELEPHONE ENCOUNTER
----- Message from Hayley Gustafson sent at 9/14/2023 10:57 AM CDT -----  Regarding: CT  CT CAP 09/21/23 @ [facility]    Patient is aware

## 2023-09-21 ENCOUNTER — HOSPITAL ENCOUNTER (OUTPATIENT)
Dept: RADIOLOGY | Facility: HOSPITAL | Age: 68
Discharge: HOME | End: 2023-09-21
Attending: INTERNAL MEDICINE
Payer: MEDICARE

## 2023-09-21 DIAGNOSIS — C18.0 CANCER OF CECUM: ICD-10-CM

## 2023-09-21 LAB
CREAT SERPL-MCNC: 1.9 MG/DL (ref 0.5–1.4)
SAMPLE: ABNORMAL

## 2023-09-21 PROCEDURE — 74177 CT ABD & PELVIS W/CONTRAST: CPT | Mod: TC

## 2023-09-21 PROCEDURE — 25500020 PHARM REV CODE 255: Performed by: INTERNAL MEDICINE

## 2023-09-21 PROCEDURE — 71260 CT THORAX DX C+: CPT | Mod: TC

## 2023-09-21 RX ADMIN — DIATRIZOATE MEGLUMINE AND DIATRIZOATE SODIUM 30 ML: 660; 100 LIQUID ORAL; RECTAL at 10:09

## 2023-09-21 RX ADMIN — IOPAMIDOL 75 ML: 755 INJECTION, SOLUTION INTRAVENOUS at 10:09

## 2023-10-16 NOTE — PROGRESS NOTES
[facility] Hematology/Oncology  PROGRESS NOTE      Subjective:         NAME: Eleazar Mark : 1955     68 y.o. male   MRN: [id number]    Referring Doc: No ref. provider found        Chief Complaint:    Chief Complaint   Patient presents with    [facility]     Pt reports no new concerns today.       Oncology/Hematology History:  Oncology History    No history exists.   Stage I Colon Cancer   - unresectable polyp on colonoscopy   - R hemicolectomy 19 - adenocarcinoma, grade 1, 0.4cm, margins uninvolved; 0/7 lymph nodes involved; T1N0Mx       Interval History:  Mr. Mark is seen today for follow-up to go over CT CAP, he is present with his wife. He feels good and denies any new complaints. He continues with some intermittent diarrhea though this has been fairly consistent since his surgery. He denies any blood in his stools or black stools. He denies any fevers, headache, cough, shortness of breath, chest pain, abdominal pain, swelling. He denies any bleeding whatsoever. He had colonoscopy on 3/24/23 with Dr. Jose Carmen. Patient will be seeing Dr. Hall for some ongoing hip issues. He has a stable lung nodule and mildly prominent abdominal lymph nodes.     ROS:   GEN: normal without any fever, night sweats or weight loss  HEENT: normal with no HA's, sore throat, stiff neck, changes in vision  CV: normal with no CP, SOB, PND, LEMONS or orthopnea  PULM: normal with no SOB, cough, hemoptysis, sputum or pleuritic pain  GI: normal with no abdominal pain, nausea, vomiting, constipation, diarrhea, melanotic stools, BRBPR, or hematemesis  : normal with no hematuria, dysuria  BREAST: normal with no mass, discharge, pain  SKIN: normal with no rash, erythema, bruising, or swelling    Allergies:  Review of patient's allergies indicates:   Allergen Reactions    Cipro [ciprofloxacin hcl]     Penicillins        Medications:    Current Outpatient Medications:     aspirin 81 MG Chew, Take 81 mg by mouth once daily., Disp: ,  Rfl:     atorvastatin (LIPITOR) 80 MG tablet, Take 80 mg by mouth once daily., Disp: , Rfl:     cetirizine (ZYRTEC) 10 MG tablet, Take 10 mg by mouth once daily., Disp: , Rfl:     cyanocobalamin (VITAMIN B-12) 1000 MCG tablet, take one tablet by mouth EVERY DAY, Disp: 30 tablet, Rfl: 3    dapagliflozin (FARXIGA) 5 mg Tab tablet, Take 5 mg by mouth once daily., Disp: , Rfl:     dupilumab (DUPIXENT PEN) 200 mg/1.14 mL PnIj, Inject 200 mg into the skin every 14 (fourteen) days., Disp: , Rfl:     ENTRESTO  mg per tablet, Take 1 tablet by mouth 2 (two) times daily., Disp: , Rfl:     fluticasone propionate (FLOVENT DISKUS) 50 mcg/actuation DsDv, Inhale into the lungs. Controller, Disp: , Rfl:     folic acid (FOLVITE) 1 MG tablet, Take 1 tablet (1,000 mcg total) by mouth once daily., Disp: 30 tablet, Rfl: 3    hydrOXYzine HCL (ATARAX) 25 MG tablet, Take 1 tablet by mouth once daily., Disp: , Rfl:     isosorbide mononitrate (IMDUR) 30 MG 24 hr tablet, Take 30 mg by mouth once daily., Disp: , Rfl:     metFORMIN (GLUCOPHAGE) 1000 MG tablet, Take 1,000 mg by mouth once daily., Disp: , Rfl:     metoprolol succinate (TOPROL-XL) 200 MG 24 hr tablet, Take 200 mg by mouth once daily., Disp: , Rfl:     pantoprazole (PROTONIX) 40 MG tablet, Take 40 mg by mouth once daily., Disp: , Rfl:     PMHx/PSHx Updates:   Past Medical History:   Diagnosis Date    Colon cancer     Essential (primary) hypertension     Heart disease     Mixed hyperlipidemia      Past Surgical History:   Procedure Laterality Date    COLON RESECTION      HAND SURGERY Right        Family History:  Family History   Problem Relation Age of Onset    Heart failure Mother     Cancer Father     Cancer Brother        Social History:  Social History     Socioeconomic History    Marital status:    Tobacco Use    Smoking status: Former     Current packs/day: 0.00     Types: Cigarettes     Quit date:      Years since quittin.8    Smokeless tobacco: Never  "  Substance and Sexual Activity    Alcohol use: Yes     Alcohol/week: 6.0 standard drinks of alcohol     Types: 6 Cans of beer per week    Drug use: Never           Objective:     ECOG SCORE             Vitals:  Blood pressure 100/63, pulse 69, temperature 97.6 °F (36.4 °C), temperature source Oral, resp. rate 18, height 5' 9" (1.753 m), weight 81.2 kg (179 lb), SpO2 98 %.    Physical Examination:   GEN: no apparent distress, comfortable; AAOx3  HEAD: atraumatic and normocephalic  EYES: no pallor, no icterus, PERRLA  ENT: OMM, no pharyngeal erythema, external ears WNL; no nasal discharge; no thrush  NECK: no masses, thyroid normal, trachea midline, no LAD/LN's, supple  CV: RRR with no murmur; normal pulse; normal S1 and S2; no pedal edema  CHEST: Normal respiratory effort; CTAB; normal breath sounds; no wheeze or crackles  ABDOM: nontender and nondistended; soft; normal bowel sounds; no rebound/guarding  MUSC/Skeletal: ROM normal; no crepitus; joints normal; no deformities or arthropathy  EXTREM: no clubbing, cyanosis, inflammation or swelling  SKIN: no rashes, lesions, ulcers, petechiae or subcutaneous nodules  : no jim  NEURO: grossly intact; motor/sensory WNL; AAOx3; no tremors, Hard of hearing  PSYCH: normal mood, affect and behavior  LYMPH: normal cervical, supraclavicular, axillary and groin LN's      Labs:   No visits with results within 1 Week(s) from this visit.   Latest known visit with results is:   Hospital Outpatient Visit on 09/21/2023   Component Date Value Ref Range Status    POC Creatinine 09/21/2023 1.9 (H)  0.5 - 1.4 mg/dL Final    Sample 09/21/2023 unknown   Final     12/14/20 wbc 6.1,  hgb 14.6,  plt 162,  CEA 1.94  3/16/23 wbc 4.7, hgb 12.9, plt 131, CEA 2.96    Radiology/Diagnostic Studies:  - CT CAP 12/14/20: 1 cm pleural-based nodule anterior lateral right upper lobe. A few suspect mildly prominent lymph nodes at the right hilum and mediastinum measuring just over 1 cm. No prior " comparison available. A few enlarged lymph nodes in the periportal pericaval region measuring up to 2 x 1 cm. Borderline hepatosplenomegaly.   - PET 12/22/20: right upper lobe subpleural nodule demonstrates no significant FDG activity. Prominent periportal lymph nodes demonstrate only mild FDG activity. No convincing PET evidence of metastatic disease.   - CT CAP 6/22/21: 1 cm pleural-based nodule again evident at the anterior lateral right upper lung. A few mildly prominent lymph nodes at the right hilum and mediastinum again evident a few enlarged lymph nodes again identified in the periportal and pericaval region.  CT A/P 2/23/22  1. A few mildly prominent lymph nodes again seen within the  pericaval/periportal region 2. Lumbar spondylosis and mild osteopenia 3. Small right fatty inguinal hernia 4. Atherosclerosis 5. Postsurgical changes again evident at the right 2 1  CT Chest 2/23/22 1. 1 cm pleural-based nodule again evident at the anterior lateral  right upper lobe which has a similar appearance to the prior exam 2. Thoracic spondylosis 3. Atherosclerosis  CT CAP 3/16/23  1. 1 cm pleural based nodule again evident at the anterolateral right upper lobe with small punctate calcification  2. A few enlarged lymph nodes again identified within the periportal pericaval region which have a similar appearance to the prior exam  3. Postsurgical changes right colon  4. Thoracolumbar spondylosis  5. Atherosclerosis  CT CAP 9/21/23:  1. Pleural base nodule again evident at the anterolateral right upper lobe; not significantly changed  2. A few mildly prominent lymph nodes again identified within the pericaval/periportal region  3. Postsurgical changes right colon  4. Thoracolumbar spondylosis    I have reviewed all available lab results and radiology reports.    Assessment/Plan:     1. Cancer of cecum        1.Stage I Colon Cancer of cecum - No adjuvant chemotherapy indicated. CT shows stable pulmonary nodule &  nonspecific nodes. Will monitor closely. CEA normal.   He is aware children need to start colonoscopy at 41yo.   Discussed genetics counseling - they do not know specific types of cancer.     2. Pulmonary nodule- stable on CT, will cont to monitor.     3. Anemia - stable    Generally routine scans are not typical for stage I disease, but will consider repeating  scans to eval for stability of findings    PLAN:  Patient with no clinical evidence of disease at this time.  He did not have his blood work for this visit so we will do it today and call him only if there is any abnormality.  We will continue surveillance in the meantime.    - Will order CT CAP today  - RTC 6 months with MD for CT CAP  - CBC,CMP, CEA today and same day as CT       Pt advised to call in the interim if anything concerning including but not limited to - fevers, night sweats, bleeding, new or unexplained pain or weight loss, changes in BM.     Discussion:     I have explained all of the above in detail and the patient understands all of the current recommendation(s). I have answered all of their questions to the best of my ability and to their complete satisfaction.   The patient is to continue with the current management plan.        Ashley Sylvester MD  Hematology/Oncology        Professional Services   I,Judi Gutierrez LPN, acted solely as a scribe for and in the presence of Dr. Ashley Sylvester, who performed these services.

## 2023-10-17 ENCOUNTER — OFFICE VISIT (OUTPATIENT)
Dept: HEMATOLOGY/ONCOLOGY | Facility: CLINIC | Age: 68
End: 2023-10-17
Payer: MEDICARE

## 2023-10-17 VITALS
SYSTOLIC BLOOD PRESSURE: 100 MMHG | RESPIRATION RATE: 18 BRPM | WEIGHT: 179 LBS | BODY MASS INDEX: 26.51 KG/M2 | OXYGEN SATURATION: 98 % | HEART RATE: 69 BPM | TEMPERATURE: 98 F | DIASTOLIC BLOOD PRESSURE: 63 MMHG | HEIGHT: 69 IN

## 2023-10-17 DIAGNOSIS — C18.0 CANCER OF CECUM: Primary | ICD-10-CM

## 2023-10-17 DIAGNOSIS — C18.9 MALIGNANT NEOPLASM OF COLON, UNSPECIFIED PART OF COLON: Primary | ICD-10-CM

## 2023-10-17 PROCEDURE — 99999 PR PBB SHADOW E&M-EST. PATIENT-LVL V: ICD-10-PCS | Mod: PBBFAC,,, | Performed by: INTERNAL MEDICINE

## 2023-10-17 PROCEDURE — 99214 OFFICE O/P EST MOD 30 MIN: CPT | Mod: S$PBB,,, | Performed by: INTERNAL MEDICINE

## 2023-10-17 PROCEDURE — 99214 PR OFFICE/OUTPT VISIT, EST, LEVL IV, 30-39 MIN: ICD-10-PCS | Mod: S$PBB,,, | Performed by: INTERNAL MEDICINE

## 2023-10-17 PROCEDURE — 99215 OFFICE O/P EST HI 40 MIN: CPT | Mod: PBBFAC | Performed by: INTERNAL MEDICINE

## 2023-10-17 PROCEDURE — 99999 PR PBB SHADOW E&M-EST. PATIENT-LVL V: CPT | Mod: PBBFAC,,, | Performed by: INTERNAL MEDICINE

## 2024-04-01 ENCOUNTER — HOSPITAL ENCOUNTER (OUTPATIENT)
Dept: RADIOLOGY | Facility: HOSPITAL | Age: 69
Discharge: HOME | End: 2024-04-01
Attending: INTERNAL MEDICINE
Payer: MEDICARE

## 2024-04-01 DIAGNOSIS — C18.9 MALIGNANT NEOPLASM OF COLON, UNSPECIFIED PART OF COLON: ICD-10-CM

## 2024-04-01 LAB
CREAT SERPL-MCNC: 1.6 MG/DL (ref 0.5–1.4)
SAMPLE: ABNORMAL

## 2024-04-01 PROCEDURE — 25500020 PHARM REV CODE 255: Performed by: INTERNAL MEDICINE

## 2024-04-01 PROCEDURE — 74177 CT ABD & PELVIS W/CONTRAST: CPT | Mod: TC

## 2024-04-01 RX ADMIN — IOPAMIDOL 100 ML: 755 INJECTION, SOLUTION INTRAVENOUS at 09:04

## 2024-04-01 RX ADMIN — DIATRIZOATE MEGLUMINE AND DIATRIZOATE SODIUM 30 ML: 660; 100 LIQUID ORAL; RECTAL at 09:04

## 2024-04-22 NOTE — PROGRESS NOTES
[facility] Hematology/Oncology  PROGRESS NOTE      Subjective:         NAME: Eleazar Mark : 1955     68 y.o. male   MRN: [id number]    Referring Doc: No ref. provider found        Chief Complaint:    Chief Complaint   Patient presents with    Cancer of cecum     Pt reports D that is resolved with meds.        Oncology/Hematology History:  Oncology History   Cancer of cecum   3/21/2023 Initial Diagnosis    Cancer of cecum     10/17/2023 Cancer Staged    Staging form: Colon and Rectum, AJCC 8th Edition  - Clinical: Stage I (cT1, cN0, cM0)     Stage I Colon Cancer   - unresectable polyp on colonoscopy   - R hemicolectomy 19 - adenocarcinoma, grade 1, 0.4cm, margins uninvolved; 0/7 lymph nodes involved; T1N0Mx       Interval History:  Mr. Mark is seen today for follow-up to go over CT CAP. He feels good and denies any new complaints. He continues with some intermittent diarrhea though this has been fairly consistent since his surgery. He denies any blood in his stools or black stools. He denies any fevers, headache, cough, shortness of breath, chest pain, abdominal pain, swelling. He denies any bleeding whatsoever. He had colonoscopy on 3/24/23 with Dr. Jose Carmen. He has a stable lung nodule and mildly prominent abdominal lymph nodes.     ROS:   GEN: normal without any fever, night sweats or weight loss  HEENT: normal with no HA's, sore throat, stiff neck, changes in vision  CV: normal with no CP, SOB, PND, LEMONS or orthopnea  PULM: normal with no SOB, cough, hemoptysis, sputum or pleuritic pain  GI: normal with no abdominal pain, nausea, vomiting, constipation, diarrhea, melanotic stools, BRBPR, or hematemesis  : normal with no hematuria, dysuria  BREAST: normal with no mass, discharge, pain  SKIN: normal with no rash, erythema, bruising, or swelling    Allergies:  Review of patient's allergies indicates:   Allergen Reactions    Cipro [ciprofloxacin hcl]     Penicillins        Medications:    Current  Outpatient Medications:     aspirin 81 MG Chew, Take 81 mg by mouth once daily., Disp: , Rfl:     atorvastatin (LIPITOR) 80 MG tablet, Take 80 mg by mouth once daily., Disp: , Rfl:     cetirizine (ZYRTEC) 10 MG tablet, Take 10 mg by mouth once daily., Disp: , Rfl:     clindamycin (CLEOCIN T) 1 % lotion, apply to underams & right lower back TWICE DAILY until clear, Disp: , Rfl:     cyanocobalamin (VITAMIN B-12) 1000 MCG tablet, take one tablet by mouth EVERY DAY, Disp: 30 tablet, Rfl: 3    dapagliflozin (FARXIGA) 5 mg Tab tablet, Take 5 mg by mouth once daily., Disp: , Rfl:     dupilumab (DUPIXENT PEN) 200 mg/1.14 mL PnIj, Inject 200 mg into the skin every 14 (fourteen) days., Disp: , Rfl:     ENTRESTO  mg per tablet, Take 1 tablet by mouth 2 (two) times daily., Disp: , Rfl:     fluticasone propionate (FLOVENT DISKUS) 50 mcg/actuation DsDv, Inhale into the lungs. Controller, Disp: , Rfl:     folic acid (FOLVITE) 1 MG tablet, Take 1 tablet (1,000 mcg total) by mouth once daily., Disp: 30 tablet, Rfl: 3    hydrOXYzine HCL (ATARAX) 25 MG tablet, Take 1 tablet by mouth once daily., Disp: , Rfl:     isosorbide mononitrate (IMDUR) 30 MG 24 hr tablet, Take 30 mg by mouth once daily., Disp: , Rfl:     JARDIANCE 10 mg tablet, TAKE 1 TABLET DAILY FOR DIABETES, Disp: , Rfl:     metFORMIN (GLUCOPHAGE) 1000 MG tablet, Take 1,000 mg by mouth once daily., Disp: , Rfl:     metoprolol succinate (TOPROL-XL) 200 MG 24 hr tablet, Take 200 mg by mouth once daily., Disp: , Rfl:     montelukast (SINGULAIR) 10 mg tablet, Take 10 mg by mouth daily as needed., Disp: , Rfl:     neomycin-polymyxin-dexamethasone (DEXACINE) 3.5 mg/g-10,000 unit/g-0.1 % Oint, APPLY THIN LAYER TO LOWER EYELID ON BOTH EYES EVERY EVENING AT BEDTIME FOR TEN DAYS, Disp: , Rfl:     pantoprazole (PROTONIX) 40 MG tablet, Take 40 mg by mouth once daily., Disp: , Rfl:     PMHx/PSHx Updates:   Past Medical History:   Diagnosis Date    Colon cancer     Essential  "(primary) hypertension     Heart disease     Mixed hyperlipidemia      Past Surgical History:   Procedure Laterality Date    COLON RESECTION      HAND SURGERY Right        Family History:  Family History   Problem Relation Name Age of Onset    Heart failure Mother      Cancer Father      Cancer Brother         Social History:  Social History     Socioeconomic History    Marital status:    Tobacco Use    Smoking status: Former     Current packs/day: 0.00     Types: Cigarettes     Quit date:      Years since quittin.3    Smokeless tobacco: Never   Substance and Sexual Activity    Alcohol use: Yes     Alcohol/week: 6.0 standard drinks of alcohol     Types: 6 Cans of beer per week    Drug use: Never           Objective:     Vitals:  Blood pressure 100/66, pulse (!) 57, temperature 97.4 °F (36.3 °C), resp. rate 20, height 5' 9.02" (1.753 m), weight 80.5 kg (177 lb 6.4 oz), SpO2 100%.    Physical Examination:   GEN: no apparent distress, comfortable; AAOx3  HEAD: atraumatic and normocephalic  EYES: no pallor, no icterus, PERRLA  ENT: OMM, no pharyngeal erythema, external ears WNL; no nasal discharge; no thrush  NECK: no masses, thyroid normal, trachea midline, no LAD/LN's, supple  CV: RRR with no murmur; normal pulse; normal S1 and S2; no pedal edema  CHEST: Normal respiratory effort; CTAB; normal breath sounds; no wheeze or crackles  ABDOM: nontender and nondistended; soft; normal bowel sounds; no rebound/guarding  MUSC/Skeletal: ROM normal; no crepitus; joints normal; no deformities or arthropathy  EXTREM: no clubbing, cyanosis, inflammation or swelling  SKIN: no rashes, lesions, ulcers, petechiae or subcutaneous nodules  : no jim  NEURO: grossly intact; motor/sensory WNL; AAOx3; no tremors, Hard of hearing  PSYCH: normal mood, affect and behavior  LYMPH: normal cervical, supraclavicular, axillary and groin LN's    ECOG SCORE    0 - Fully active-able to carry on all pre-disease performance without " restriction           Labs:   Lab Visit on 04/23/2024   Component Date Value Ref Range Status    Sodium Level 04/23/2024 142  136 - 145 mmol/L Final    Potassium Level 04/23/2024 6.1 (H)  3.5 - 5.1 mmol/L Final    Chloride 04/23/2024 110 (H)  98 - 107 mmol/L Final    Carbon Dioxide 04/23/2024 24  23 - 31 mmol/L Final    Glucose Level 04/23/2024 144 (H)  82 - 115 mg/dL Final    Blood Urea Nitrogen 04/23/2024 21.0  8.4 - 25.7 mg/dL Final    Creatinine 04/23/2024 1.52 (H)  0.73 - 1.18 mg/dL Final    Calcium Level Total 04/23/2024 9.9  8.8 - 10.0 mg/dL Final    Protein Total 04/23/2024 7.2  5.8 - 7.6 gm/dL Final    Albumin Level 04/23/2024 4.0  3.4 - 4.8 g/dL Final    Globulin 04/23/2024 3.2  2.4 - 3.5 gm/dL Final    Albumin/Globulin Ratio 04/23/2024 1.3  1.1 - 2.0 ratio Final    Bilirubin Total 04/23/2024 0.5  <=1.5 mg/dL Final    Alkaline Phosphatase 04/23/2024 57  40 - 150 unit/L Final    Alanine Aminotransferase 04/23/2024 10  0 - 55 unit/L Final    Aspartate Aminotransferase 04/23/2024 18  5 - 34 unit/L Final    eGFR 04/23/2024 49  mls/min/1.73/m2 Final    WBC 04/23/2024 5.62  4.50 - 11.50 x10(3)/mcL Final    RBC 04/23/2024 5.05  4.70 - 6.10 x10(6)/mcL Final    Hgb 04/23/2024 14.6  14.0 - 18.0 g/dL Final    Hct 04/23/2024 45.7  42.0 - 52.0 % Final    MCV 04/23/2024 90.5  80.0 - 94.0 fL Final    MCH 04/23/2024 28.9  27.0 - 31.0 pg Final    MCHC 04/23/2024 31.9 (L)  33.0 - 36.0 g/dL Final    RDW 04/23/2024 14.6  11.5 - 17.0 % Final    Platelet 04/23/2024 142  130 - 400 x10(3)/mcL Final    MPV 04/23/2024 10.2  7.4 - 10.4 fL Final    Neut % 04/23/2024 53.5  % Final    Lymph % 04/23/2024 25.8  % Final    Mono % 04/23/2024 9.3  % Final    Eos % 04/23/2024 9.1  % Final    Basophil % 04/23/2024 1.4  % Final    Lymph # 04/23/2024 1.45  0.6 - 4.6 x10(3)/mcL Final    Neut # 04/23/2024 3.01  2.1 - 9.2 x10(3)/mcL Final    Mono # 04/23/2024 0.52  0.1 - 1.3 x10(3)/mcL Final    Eos # 04/23/2024 0.51  0 - 0.9 x10(3)/mcL Final     Baso # 04/23/2024 0.08  <=0.2 x10(3)/mcL Final    IG# 04/23/2024 0.05 (H)  0 - 0.04 x10(3)/mcL Final    IG% 04/23/2024 0.9  % Final     12/14/20 wbc 6.1,  hgb 14.6,  plt 162,  CEA 1.94  3/16/23 wbc 4.7, hgb 12.9, plt 131, CEA 2.96    Radiology/Diagnostic Studies:  - CT CAP 12/14/20: 1 cm pleural-based nodule anterior lateral right upper lobe. A few suspect mildly prominent lymph nodes at the right hilum and mediastinum measuring just over 1 cm. No prior comparison available. A few enlarged lymph nodes in the periportal pericaval region measuring up to 2 x 1 cm. Borderline hepatosplenomegaly.   - PET 12/22/20: right upper lobe subpleural nodule demonstrates no significant FDG activity. Prominent periportal lymph nodes demonstrate only mild FDG activity. No convincing PET evidence of metastatic disease.   - CT CAP 6/22/21: 1 cm pleural-based nodule again evident at the anterior lateral right upper lung. A few mildly prominent lymph nodes at the right hilum and mediastinum again evident a few enlarged lymph nodes again identified in the periportal and pericaval region.  CT A/P 2/23/22  1. A few mildly prominent lymph nodes again seen within the  pericaval/periportal region 2. Lumbar spondylosis and mild osteopenia 3. Small right fatty inguinal hernia 4. Atherosclerosis 5. Postsurgical changes again evident at the right 2 1  CT Chest 2/23/22 1. 1 cm pleural-based nodule again evident at the anterior lateral  right upper lobe which has a similar appearance to the prior exam 2. Thoracic spondylosis 3. Atherosclerosis  CT CAP 3/16/23  1. 1 cm pleural based nodule again evident at the anterolateral right upper lobe with small punctate calcification  2. A few enlarged lymph nodes again identified within the periportal pericaval region which have a similar appearance to the prior exam  3. Postsurgical changes right colon  4. Thoracolumbar spondylosis  5. Atherosclerosis  CT CAP 9/21/23:  1. Pleural base nodule again evident  at the anterolateral right upper lobe; not significantly changed  2. A few mildly prominent lymph nodes again identified within the pericaval/periportal region  3. Postsurgical changes right colon  4. Thoracolumbar spondylosis  CT CAP 4/1/24:  1. Stable pleural base nodule again evident at the anterior right upper lobe  2. Stable mildly prominent lymph nodes again identified at the pericaval and periportal region  3. Mild hepatomegaly with steatosis  4. Borderline splenomegaly  5. Atherosclerosis  6. Postsurgical changes right lower quadrant  7. Unchanged right fatty inguinal hernia    I have reviewed all available lab results and radiology reports.    Assessment/Plan:     1. Cancer of cecum    2. Localized enlarged lymph nodes        1.Stage I Colon Cancer of cecum - No adjuvant chemotherapy indicated. CT shows stable pulmonary nodule & nonspecific nodes. Will monitor closely. CEA normal.   He is aware children need to start colonoscopy at 41yo.   Discussed genetics counseling - they do not know specific types of cancer.     2. Pulmonary nodule- stable on CT, will cont to monitor.     3. LAD - stable    Generally routine scans are not typical for stage I disease, but will consider repeating  scans to eval for stability of findings    PLAN:  Patient with no clinical evidence of disease at this time.All findings stable.  We will continue surveillance in the meantime.    - Will order CT CAP today  - RTC 6 months with MD for CT CAP  - CBC,CMP, CEA- same day as CT @ [facility]      Pt advised to call in the interim if anything concerning including but not limited to - fevers, night sweats, bleeding, new or unexplained pain or weight loss, changes in BM.     Discussion:     I have explained all of the above in detail and the patient understands all of the current recommendation(s). I have answered all of their questions to the best of my ability and to their complete satisfaction.   The patient is to continue with the current  management plan.      Ashley Sylvester MD  Hematology/Oncology      Professional Services   I,Judi Gutierrez LPN, acted solely as a scribe for and in the presence of Dr. Ashley Sylvester, who performed these services.      Addendum: CMP showed a K 6.1 ( see above). By the time we got the results patient was gone.  We called the patient to repeat his blood work and he will going to do it close to home.  Potassium is > 5.8,  then he will go to the emergency department. He verbalized understanding.

## 2024-04-23 ENCOUNTER — LAB VISIT (OUTPATIENT)
Dept: LAB | Facility: HOSPITAL | Age: 69
End: 2024-04-23
Attending: INTERNAL MEDICINE
Payer: MEDICARE

## 2024-04-23 ENCOUNTER — OFFICE VISIT (OUTPATIENT)
Dept: HEMATOLOGY/ONCOLOGY | Facility: CLINIC | Age: 69
End: 2024-04-23
Payer: MEDICARE

## 2024-04-23 VITALS
RESPIRATION RATE: 20 BRPM | OXYGEN SATURATION: 100 % | TEMPERATURE: 97 F | DIASTOLIC BLOOD PRESSURE: 66 MMHG | HEART RATE: 57 BPM | WEIGHT: 177.38 LBS | HEIGHT: 69 IN | BODY MASS INDEX: 26.27 KG/M2 | SYSTOLIC BLOOD PRESSURE: 100 MMHG

## 2024-04-23 DIAGNOSIS — E87.5 HYPERKALEMIA: Primary | ICD-10-CM

## 2024-04-23 DIAGNOSIS — R59.0 LOCALIZED ENLARGED LYMPH NODES: ICD-10-CM

## 2024-04-23 DIAGNOSIS — E87.5 HYPERKALEMIA: ICD-10-CM

## 2024-04-23 DIAGNOSIS — C18.0 CANCER OF CECUM: Primary | ICD-10-CM

## 2024-04-23 DIAGNOSIS — C18.9 MALIGNANT NEOPLASM OF COLON, UNSPECIFIED PART OF COLON: ICD-10-CM

## 2024-04-23 LAB
ALBUMIN SERPL-MCNC: 4 G/DL (ref 3.4–4.8)
ALBUMIN SERPL-MCNC: 4.6 G/DL (ref 3.4–5)
ALBUMIN/GLOB SERPL: 1.3 RATIO (ref 1.1–2)
ALBUMIN/GLOB SERPL: 1.7 RATIO
ALP SERPL-CCNC: 57 UNIT/L (ref 40–150)
ALP SERPL-CCNC: 61 UNIT/L (ref 50–144)
ALT SERPL-CCNC: 10 UNIT/L (ref 0–55)
ALT SERPL-CCNC: 22 UNIT/L (ref 1–45)
ANION GAP SERPL CALC-SCNC: 9 MEQ/L (ref 2–13)
AST SERPL-CCNC: 18 UNIT/L (ref 5–34)
AST SERPL-CCNC: 28 UNIT/L (ref 17–59)
BASOPHILS # BLD AUTO: 0.08 X10(3)/MCL
BASOPHILS NFR BLD AUTO: 1.4 %
BILIRUB SERPL-MCNC: 0.5 MG/DL
BILIRUB SERPL-MCNC: 0.5 MG/DL (ref 0–1)
BUN SERPL-MCNC: 21 MG/DL (ref 8.4–25.7)
BUN SERPL-MCNC: 22 MG/DL (ref 7–20)
CALCIUM SERPL-MCNC: 9.7 MG/DL (ref 8.4–10.2)
CALCIUM SERPL-MCNC: 9.9 MG/DL (ref 8.8–10)
CEA SERPL-MCNC: 2.34 NG/ML (ref 0–3)
CHLORIDE SERPL-SCNC: 106 MMOL/L (ref 98–110)
CHLORIDE SERPL-SCNC: 110 MMOL/L (ref 98–107)
CO2 SERPL-SCNC: 24 MMOL/L (ref 23–31)
CO2 SERPL-SCNC: 26 MMOL/L (ref 21–32)
CREAT SERPL-MCNC: 1.52 MG/DL (ref 0.73–1.18)
CREAT SERPL-MCNC: 1.62 MG/DL (ref 0.66–1.25)
CREAT/UREA NIT SERPL: 14 (ref 12–20)
EOSINOPHIL # BLD AUTO: 0.51 X10(3)/MCL (ref 0–0.9)
EOSINOPHIL NFR BLD AUTO: 9.1 %
ERYTHROCYTE [DISTWIDTH] IN BLOOD BY AUTOMATED COUNT: 14.6 % (ref 11.5–17)
GFR SERPLBLD CREATININE-BSD FMLA CKD-EPI: 46 MLS/MIN/1.73/M2
GFR SERPLBLD CREATININE-BSD FMLA CKD-EPI: 49 MLS/MIN/1.73/M2
GLOBULIN SER-MCNC: 2.7 GM/DL (ref 2–3.9)
GLOBULIN SER-MCNC: 3.2 GM/DL (ref 2.4–3.5)
GLUCOSE SERPL-MCNC: 144 MG/DL (ref 82–115)
GLUCOSE SERPL-MCNC: 211 MG/DL (ref 70–115)
HCT VFR BLD AUTO: 45.7 % (ref 42–52)
HGB BLD-MCNC: 14.6 G/DL (ref 14–18)
IMM GRANULOCYTES # BLD AUTO: 0.05 X10(3)/MCL (ref 0–0.04)
IMM GRANULOCYTES NFR BLD AUTO: 0.9 %
LYMPHOCYTES # BLD AUTO: 1.45 X10(3)/MCL (ref 0.6–4.6)
LYMPHOCYTES NFR BLD AUTO: 25.8 %
MCH RBC QN AUTO: 28.9 PG (ref 27–31)
MCHC RBC AUTO-ENTMCNC: 31.9 G/DL (ref 33–36)
MCV RBC AUTO: 90.5 FL (ref 80–94)
MONOCYTES # BLD AUTO: 0.52 X10(3)/MCL (ref 0.1–1.3)
MONOCYTES NFR BLD AUTO: 9.3 %
NEUTROPHILS # BLD AUTO: 3.01 X10(3)/MCL (ref 2.1–9.2)
NEUTROPHILS NFR BLD AUTO: 53.5 %
PLATELET # BLD AUTO: 142 X10(3)/MCL (ref 130–400)
PMV BLD AUTO: 10.2 FL (ref 7.4–10.4)
POTASSIUM SERPL-SCNC: 5.3 MMOL/L (ref 3.5–5.1)
POTASSIUM SERPL-SCNC: 6.1 MMOL/L (ref 3.5–5.1)
PROT SERPL-MCNC: 7.2 GM/DL (ref 5.8–7.6)
PROT SERPL-MCNC: 7.3 GM/DL (ref 6.3–8.2)
RBC # BLD AUTO: 5.05 X10(6)/MCL (ref 4.7–6.1)
SODIUM SERPL-SCNC: 141 MMOL/L (ref 135–145)
SODIUM SERPL-SCNC: 142 MMOL/L (ref 136–145)
WBC # SPEC AUTO: 5.62 X10(3)/MCL (ref 4.5–11.5)

## 2024-04-23 PROCEDURE — 36415 COLL VENOUS BLD VENIPUNCTURE: CPT

## 2024-04-23 PROCEDURE — 82378 CARCINOEMBRYONIC ANTIGEN: CPT

## 2024-04-23 PROCEDURE — 99214 OFFICE O/P EST MOD 30 MIN: CPT | Mod: PBBFAC | Performed by: INTERNAL MEDICINE

## 2024-04-23 PROCEDURE — 99999 PR PBB SHADOW E&M-EST. PATIENT-LVL IV: CPT | Mod: PBBFAC,,, | Performed by: INTERNAL MEDICINE

## 2024-04-23 PROCEDURE — 99214 OFFICE O/P EST MOD 30 MIN: CPT | Mod: S$PBB,,, | Performed by: INTERNAL MEDICINE

## 2024-04-23 PROCEDURE — 80053 COMPREHEN METABOLIC PANEL: CPT

## 2024-04-23 PROCEDURE — 85025 COMPLETE CBC W/AUTO DIFF WBC: CPT

## 2024-04-23 RX ORDER — NEOMYCIN SULFATE, POLYMYXIN B SULFATE, AND DEXAMETHASONE 3.5; 10000; 1 MG/G; [USP'U]/G; MG/G
OINTMENT OPHTHALMIC
COMMUNITY
Start: 2024-03-14

## 2024-04-23 RX ORDER — CLINDAMYCIN PHOSPHATE 10 UG/ML
LOTION TOPICAL
COMMUNITY
Start: 2024-04-01

## 2024-04-23 RX ORDER — EMPAGLIFLOZIN 10 MG/1
TABLET, FILM COATED ORAL
COMMUNITY
Start: 2024-04-18

## 2024-04-23 RX ORDER — MONTELUKAST SODIUM 10 MG/1
10 TABLET ORAL DAILY PRN
COMMUNITY
Start: 2024-03-26

## 2024-08-19 ENCOUNTER — HOSPITAL ENCOUNTER (OUTPATIENT)
Dept: RADIOLOGY | Facility: HOSPITAL | Age: 69
Discharge: HOME | End: 2024-08-19
Attending: FAMILY MEDICINE
Payer: MEDICARE

## 2024-08-19 DIAGNOSIS — R10.9 ACUTE ABDOMINAL PAIN: ICD-10-CM

## 2024-08-19 PROCEDURE — 74019 RADEX ABDOMEN 2 VIEWS: CPT | Mod: TC

## 2024-10-07 ENCOUNTER — HOSPITAL ENCOUNTER (OUTPATIENT)
Dept: RADIOLOGY | Facility: HOSPITAL | Age: 69
Discharge: HOME | End: 2024-10-07
Attending: INTERNAL MEDICINE
Payer: MEDICARE

## 2024-10-07 DIAGNOSIS — C18.0 CANCER OF CECUM: ICD-10-CM

## 2024-10-07 LAB
CREAT SERPL-MCNC: 1.8 MG/DL (ref 0.5–1.4)
SAMPLE: ABNORMAL

## 2024-10-07 PROCEDURE — 71260 CT THORAX DX C+: CPT | Mod: TC

## 2024-10-07 PROCEDURE — 74177 CT ABD & PELVIS W/CONTRAST: CPT | Mod: TC

## 2024-10-07 PROCEDURE — 25500020 PHARM REV CODE 255: Performed by: INTERNAL MEDICINE

## 2024-10-07 RX ORDER — IOPAMIDOL 755 MG/ML
100 INJECTION, SOLUTION INTRAVASCULAR
Status: DISCONTINUED | OUTPATIENT
Start: 2024-10-07 | End: 2024-10-07

## 2024-10-07 RX ORDER — DIATRIZOATE MEGLUMINE AND DIATRIZOATE SODIUM 660; 100 MG/ML; MG/ML
30 SOLUTION ORAL; RECTAL
Status: COMPLETED | OUTPATIENT
Start: 2024-10-07 | End: 2024-10-07

## 2024-10-07 RX ORDER — IOPAMIDOL 755 MG/ML
100 INJECTION, SOLUTION INTRAVASCULAR
Status: COMPLETED | OUTPATIENT
Start: 2024-10-07 | End: 2024-10-07

## 2024-10-07 RX ADMIN — IOPAMIDOL 100 ML: 755 INJECTION, SOLUTION INTRAVENOUS at 10:10

## 2024-10-07 RX ADMIN — DIATRIZOATE MEGLUMINE AND DIATRIZOATE SODIUM 30 ML: 660; 100 SOLUTION ORAL; RECTAL at 10:10

## 2024-10-08 ENCOUNTER — HOSPITAL ENCOUNTER (OUTPATIENT)
Dept: RADIOLOGY | Facility: HOSPITAL | Age: 69
Discharge: HOME | End: 2024-10-08
Attending: OTOLARYNGOLOGY
Payer: MEDICARE

## 2024-10-08 DIAGNOSIS — H92.11 OTORRHEA OF RIGHT EAR: ICD-10-CM

## 2024-10-08 PROCEDURE — 70480 CT ORBIT/EAR/FOSSA W/O DYE: CPT | Mod: TC

## 2024-10-22 ENCOUNTER — OFFICE VISIT (OUTPATIENT)
Dept: HEMATOLOGY/ONCOLOGY | Facility: CLINIC | Age: 69
End: 2024-10-22
Payer: MEDICARE

## 2024-10-22 ENCOUNTER — LAB VISIT (OUTPATIENT)
Dept: LAB | Facility: HOSPITAL | Age: 69
End: 2024-10-22
Attending: INTERNAL MEDICINE
Payer: MEDICARE

## 2024-10-22 VITALS
RESPIRATION RATE: 16 BRPM | SYSTOLIC BLOOD PRESSURE: 111 MMHG | BODY MASS INDEX: 26.05 KG/M2 | OXYGEN SATURATION: 98 % | HEIGHT: 69 IN | WEIGHT: 175.88 LBS | HEART RATE: 60 BPM | DIASTOLIC BLOOD PRESSURE: 71 MMHG | TEMPERATURE: 98 F

## 2024-10-22 DIAGNOSIS — C18.0 CANCER OF CECUM: ICD-10-CM

## 2024-10-22 DIAGNOSIS — C18.0 CANCER OF CECUM: Primary | ICD-10-CM

## 2024-10-22 DIAGNOSIS — R59.0 LOCALIZED ENLARGED LYMPH NODES: ICD-10-CM

## 2024-10-22 LAB
ALBUMIN SERPL-MCNC: 4.1 G/DL (ref 3.4–4.8)
ALBUMIN/GLOB SERPL: 1.2 RATIO (ref 1.1–2)
ALP SERPL-CCNC: 72 UNIT/L (ref 40–150)
ALT SERPL-CCNC: 16 UNIT/L (ref 0–55)
ANION GAP SERPL CALC-SCNC: 7 MEQ/L
AST SERPL-CCNC: 19 UNIT/L (ref 5–34)
BASOPHILS # BLD AUTO: 0.06 X10(3)/MCL
BASOPHILS NFR BLD AUTO: 1.1 %
BILIRUB SERPL-MCNC: 0.6 MG/DL
BUN SERPL-MCNC: 25 MG/DL (ref 8.4–25.7)
CALCIUM SERPL-MCNC: 10.3 MG/DL (ref 8.8–10)
CEA SERPL-MCNC: 2.87 NG/ML (ref 0–3)
CHLORIDE SERPL-SCNC: 106 MMOL/L (ref 98–107)
CO2 SERPL-SCNC: 27 MMOL/L (ref 23–31)
CREAT SERPL-MCNC: 1.63 MG/DL (ref 0.72–1.25)
CREAT/UREA NIT SERPL: 15
EOSINOPHIL # BLD AUTO: 0.23 X10(3)/MCL (ref 0–0.9)
EOSINOPHIL NFR BLD AUTO: 4.1 %
ERYTHROCYTE [DISTWIDTH] IN BLOOD BY AUTOMATED COUNT: 14.9 % (ref 11.5–17)
GFR SERPLBLD CREATININE-BSD FMLA CKD-EPI: 45 ML/MIN/1.73/M2
GLOBULIN SER-MCNC: 3.3 GM/DL (ref 2.4–3.5)
GLUCOSE SERPL-MCNC: 166 MG/DL (ref 82–115)
HCT VFR BLD AUTO: 45.7 % (ref 42–52)
HGB BLD-MCNC: 14.6 G/DL (ref 14–18)
IMM GRANULOCYTES # BLD AUTO: 0.05 X10(3)/MCL (ref 0–0.04)
IMM GRANULOCYTES NFR BLD AUTO: 0.9 %
LYMPHOCYTES # BLD AUTO: 1.52 X10(3)/MCL (ref 0.6–4.6)
LYMPHOCYTES NFR BLD AUTO: 27.3 %
MCH RBC QN AUTO: 29.4 PG (ref 27–31)
MCHC RBC AUTO-ENTMCNC: 31.9 G/DL (ref 33–36)
MCV RBC AUTO: 92 FL (ref 80–94)
MONOCYTES # BLD AUTO: 0.53 X10(3)/MCL (ref 0.1–1.3)
MONOCYTES NFR BLD AUTO: 9.5 %
NEUTROPHILS # BLD AUTO: 3.17 X10(3)/MCL (ref 2.1–9.2)
NEUTROPHILS NFR BLD AUTO: 57.1 %
PLATELET # BLD AUTO: 143 X10(3)/MCL (ref 130–400)
PMV BLD AUTO: 10.6 FL (ref 7.4–10.4)
POTASSIUM SERPL-SCNC: 5.1 MMOL/L (ref 3.5–5.1)
PROT SERPL-MCNC: 7.4 GM/DL (ref 5.8–7.6)
RBC # BLD AUTO: 4.97 X10(6)/MCL (ref 4.7–6.1)
SODIUM SERPL-SCNC: 140 MMOL/L (ref 136–145)
WBC # BLD AUTO: 5.56 X10(3)/MCL (ref 4.5–11.5)

## 2024-10-22 PROCEDURE — 99215 OFFICE O/P EST HI 40 MIN: CPT | Mod: PBBFAC

## 2024-10-22 PROCEDURE — 36415 COLL VENOUS BLD VENIPUNCTURE: CPT

## 2024-10-22 PROCEDURE — 99214 OFFICE O/P EST MOD 30 MIN: CPT | Mod: S$PBB,,,

## 2024-10-22 PROCEDURE — 85025 COMPLETE CBC W/AUTO DIFF WBC: CPT

## 2024-10-22 PROCEDURE — 80053 COMPREHEN METABOLIC PANEL: CPT

## 2024-10-22 PROCEDURE — 82378 CARCINOEMBRYONIC ANTIGEN: CPT

## 2024-10-22 PROCEDURE — 99999 PR PBB SHADOW E&M-EST. PATIENT-LVL V: CPT | Mod: PBBFAC,,,

## 2024-10-22 NOTE — PROGRESS NOTES
[facility] Hematology/Oncology  PROGRESS NOTE      Subjective:         NAME: Eleazar Mark : 1955     69 y.o. male   MRN: [id number]    Referring Doc: No ref. provider found        Chief Complaint:    Chief Complaint   Patient presents with    Cancer of cecum     No complaints.       Oncology/Hematology History:  Oncology History   Cancer of cecum   3/21/2023 Initial Diagnosis    Cancer of cecum     10/17/2023 Cancer Staged    Staging form: Colon and Rectum, AJCC 8th Edition  - Clinical: Stage I (cT1, cN0, cM0)     Stage I Colon Cancer   - unresectable polyp on colonoscopy   - R hemicolectomy 19 - adenocarcinoma, grade 1, 0.4cm, margins uninvolved; 0/7 lymph nodes involved; T1N0Mx       Interval History:  Mr. Mark is seen today for follow-up to go over CT CAP. He feels good and denies any new complaints. He is no longer complaining of diarrhea, resolved once metformin was decreased. He denies any blood in his stools or black stools. He denies any fevers, headache, cough, shortness of breath, chest pain, abdominal pain, swelling. He denies any bleeding whatsoever. He had colonoscopy on 3/24/23 with Dr. Jose Carmen. He has a stable lung nodule and mildly prominent abdominal lymph nodes.     ROS:   GEN: normal without any fever, night sweats or weight loss  HEENT: normal with no HA's, sore throat, stiff neck, changes in vision  CV: normal with no CP, SOB, PND, LEMONS or orthopnea  PULM: normal with no SOB, cough, hemoptysis, sputum or pleuritic pain  GI: normal with no abdominal pain, nausea, vomiting, constipation, diarrhea, melanotic stools, BRBPR, or hematemesis  : normal with no hematuria, dysuria  BREAST: normal with no mass, discharge, pain  SKIN: normal with no rash, erythema, bruising, or swelling    Allergies:  Review of patient's allergies indicates:   Allergen Reactions    Cipro [ciprofloxacin hcl]     Penicillins        Medications:    Current Outpatient Medications:     aspirin 81 MG Chew, Take 81  mg by mouth once daily., Disp: , Rfl:     atorvastatin (LIPITOR) 80 MG tablet, Take 80 mg by mouth once daily., Disp: , Rfl:     cetirizine (ZYRTEC) 10 MG tablet, Take 10 mg by mouth once daily., Disp: , Rfl:     clindamycin (CLEOCIN T) 1 % lotion, apply to underams & right lower back TWICE DAILY until clear, Disp: , Rfl:     cyanocobalamin (VITAMIN B-12) 1000 MCG tablet, take one tablet by mouth EVERY DAY, Disp: 30 tablet, Rfl: 3    dapagliflozin (FARXIGA) 5 mg Tab tablet, Take 5 mg by mouth once daily., Disp: , Rfl:     dupilumab (DUPIXENT PEN) 200 mg/1.14 mL PnIj, Inject 200 mg into the skin every 14 (fourteen) days., Disp: , Rfl:     ENTRESTO  mg per tablet, Take 1 tablet by mouth 2 (two) times daily., Disp: , Rfl:     fluticasone propionate (FLOVENT DISKUS) 50 mcg/actuation DsDv, Inhale into the lungs. Controller, Disp: , Rfl:     folic acid (FOLVITE) 1 MG tablet, Take 1 tablet (1,000 mcg total) by mouth once daily., Disp: 30 tablet, Rfl: 3    hydrOXYzine HCL (ATARAX) 25 MG tablet, Take 1 tablet by mouth once daily., Disp: , Rfl:     isosorbide mononitrate (IMDUR) 30 MG 24 hr tablet, Take 30 mg by mouth once daily., Disp: , Rfl:     JARDIANCE 10 mg tablet, TAKE 1 TABLET DAILY FOR DIABETES, Disp: , Rfl:     metFORMIN (GLUCOPHAGE) 1000 MG tablet, Take 1,000 mg by mouth once daily., Disp: , Rfl:     metoprolol succinate (TOPROL-XL) 200 MG 24 hr tablet, Take 200 mg by mouth once daily., Disp: , Rfl:     montelukast (SINGULAIR) 10 mg tablet, Take 10 mg by mouth daily as needed., Disp: , Rfl:     neomycin-polymyxin-dexamethasone (DEXACINE) 3.5 mg/g-10,000 unit/g-0.1 % Oint, APPLY THIN LAYER TO LOWER EYELID ON BOTH EYES EVERY EVENING AT BEDTIME FOR TEN DAYS, Disp: , Rfl:     pantoprazole (PROTONIX) 40 MG tablet, Take 40 mg by mouth once daily., Disp: , Rfl:     PMHx/PSHx Updates:   Past Medical History:   Diagnosis Date    Colon cancer     Essential (primary) hypertension     Heart disease     Mixed  hyperlipidemia      Past Surgical History:   Procedure Laterality Date    COLON RESECTION      HAND SURGERY Right        Family History:  Family History   Problem Relation Name Age of Onset    Heart failure Mother      Cancer Father      Cancer Brother         Social History:  Social History     Socioeconomic History    Marital status:    Tobacco Use    Smoking status: Former     Current packs/day: 0.00     Types: Cigarettes     Quit date:      Years since quittin.8    Smokeless tobacco: Never   Substance and Sexual Activity    Alcohol use: Yes     Alcohol/week: 6.0 standard drinks of alcohol     Types: 6 Cans of beer per week    Drug use: Never           Objective:     Vitals:  There were no vitals taken for this visit.    Physical Examination:   GEN: no apparent distress, comfortable; AAOx3  HEAD: atraumatic and normocephalic  EYES: no pallor, no icterus, PERRLA  ENT: OMM, no pharyngeal erythema, external ears WNL; no nasal discharge; no thrush  NECK: no masses, thyroid normal, trachea midline, no LAD/LN's, supple  CV: RRR with no murmur; normal pulse; normal S1 and S2; no pedal edema  CHEST: Normal respiratory effort; CTAB; normal breath sounds; no wheeze or crackles  ABDOM: nontender and nondistended; soft; normal bowel sounds; no rebound/guarding  MUSC/Skeletal: ROM normal; no crepitus; joints normal; no deformities or arthropathy  EXTREM: no clubbing, cyanosis, inflammation or swelling  SKIN: no rashes, lesions, ulcers, petechiae or subcutaneous nodules  : no jim  NEURO: grossly intact; motor/sensory WNL; AAOx3; no tremors, Hard of hearing  PSYCH: normal mood, affect and behavior  LYMPH: normal cervical, supraclavicular, axillary and groin LN's    ECOG SCORE               Labs:   Lab Visit on 10/22/2024   Component Date Value Ref Range Status    Sodium 10/22/2024 140  136 - 145 mmol/L Final    Potassium 10/22/2024 5.1  3.5 - 5.1 mmol/L Final    Chloride 10/22/2024 106  98 - 107 mmol/L Final     CO2 10/22/2024 27  23 - 31 mmol/L Final    Glucose 10/22/2024 166 (H)  82 - 115 mg/dL Final    Blood Urea Nitrogen 10/22/2024 25.0  8.4 - 25.7 mg/dL Final    Creatinine 10/22/2024 1.63 (H)  0.72 - 1.25 mg/dL Final    Calcium 10/22/2024 10.3 (H)  8.8 - 10.0 mg/dL Final    Protein Total 10/22/2024 7.4  5.8 - 7.6 gm/dL Final    Albumin 10/22/2024 4.1  3.4 - 4.8 g/dL Final    Globulin 10/22/2024 3.3  2.4 - 3.5 gm/dL Final    Albumin/Globulin Ratio 10/22/2024 1.2  1.1 - 2.0 ratio Final    Bilirubin Total 10/22/2024 0.6  <=1.5 mg/dL Final    ALP 10/22/2024 72  40 - 150 unit/L Final    ALT 10/22/2024 16  0 - 55 unit/L Final    AST 10/22/2024 19  5 - 34 unit/L Final    eGFR 10/22/2024 45  mL/min/1.73/m2 Final    Anion Gap 10/22/2024 7.0  mEq/L Final    BUN/Creatinine Ratio 10/22/2024 15   Final    WBC 10/22/2024 5.56  4.50 - 11.50 x10(3)/mcL Final    RBC 10/22/2024 4.97  4.70 - 6.10 x10(6)/mcL Final    Hgb 10/22/2024 14.6  14.0 - 18.0 g/dL Final    Hct 10/22/2024 45.7  42.0 - 52.0 % Final    MCV 10/22/2024 92.0  80.0 - 94.0 fL Final    MCH 10/22/2024 29.4  27.0 - 31.0 pg Final    MCHC 10/22/2024 31.9 (L)  33.0 - 36.0 g/dL Final    RDW 10/22/2024 14.9  11.5 - 17.0 % Final    Platelet 10/22/2024 143  130 - 400 x10(3)/mcL Final    MPV 10/22/2024 10.6 (H)  7.4 - 10.4 fL Final    Neut % 10/22/2024 57.1  % Final    Lymph % 10/22/2024 27.3  % Final    Mono % 10/22/2024 9.5  % Final    Eos % 10/22/2024 4.1  % Final    Basophil % 10/22/2024 1.1  % Final    Lymph # 10/22/2024 1.52  0.6 - 4.6 x10(3)/mcL Final    Neut # 10/22/2024 3.17  2.1 - 9.2 x10(3)/mcL Final    Mono # 10/22/2024 0.53  0.1 - 1.3 x10(3)/mcL Final    Eos # 10/22/2024 0.23  0 - 0.9 x10(3)/mcL Final    Baso # 10/22/2024 0.06  <=0.2 x10(3)/mcL Final    IG# 10/22/2024 0.05 (H)  0 - 0.04 x10(3)/mcL Final    IG% 10/22/2024 0.9  % Final     12/14/20 wbc 6.1,  hgb 14.6,  plt 162,  CEA 1.94  3/16/23 wbc 4.7, hgb 12.9, plt 131, CEA 2.96    Radiology/Diagnostic Studies:  -  CT CAP 12/14/20: 1 cm pleural-based nodule anterior lateral right upper lobe. A few suspect mildly prominent lymph nodes at the right hilum and mediastinum measuring just over 1 cm. No prior comparison available. A few enlarged lymph nodes in the periportal pericaval region measuring up to 2 x 1 cm. Borderline hepatosplenomegaly.   - PET 12/22/20: right upper lobe subpleural nodule demonstrates no significant FDG activity. Prominent periportal lymph nodes demonstrate only mild FDG activity. No convincing PET evidence of metastatic disease.   - CT CAP 6/22/21: 1 cm pleural-based nodule again evident at the anterior lateral right upper lung. A few mildly prominent lymph nodes at the right hilum and mediastinum again evident a few enlarged lymph nodes again identified in the periportal and pericaval region.  CT A/P 2/23/22  1. A few mildly prominent lymph nodes again seen within the  pericaval/periportal region 2. Lumbar spondylosis and mild osteopenia 3. Small right fatty inguinal hernia 4. Atherosclerosis 5. Postsurgical changes again evident at the right 2 1  CT Chest 2/23/22 1. 1 cm pleural-based nodule again evident at the anterior lateral  right upper lobe which has a similar appearance to the prior exam 2. Thoracic spondylosis 3. Atherosclerosis  CT CAP 3/16/23  1. 1 cm pleural based nodule again evident at the anterolateral right upper lobe with small punctate calcification  2. A few enlarged lymph nodes again identified within the periportal pericaval region which have a similar appearance to the prior exam  3. Postsurgical changes right colon  4. Thoracolumbar spondylosis  5. Atherosclerosis  CT CAP 9/21/23:  1. Pleural base nodule again evident at the anterolateral right upper lobe; not significantly changed  2. A few mildly prominent lymph nodes again identified within the pericaval/periportal region  3. Postsurgical changes right colon  4. Thoracolumbar spondylosis  CT CAP 4/1/24:  1. Stable pleural base  nodule again evident at the anterior right upper lobe  2. Stable mildly prominent lymph nodes again identified at the pericaval and periportal region  3. Mild hepatomegaly with steatosis  4. Borderline splenomegaly  5. Atherosclerosis  6. Postsurgical changes right lower quadrant  7. Unchanged right fatty inguinal hernia  CT CAP 10/7/24:  1. Stable pleural base nodules again evident at the anterolateral right upper lobe  2. Stable mildly prominent lymph nodes again identified in the pericaval and periportal region  3. Stable prominent lymph nodes again identified within the pericaval/periportal region.  4. Borderline splenomegaly    I have reviewed all available lab results and radiology reports.    Assessment/Plan:     1. Cancer of cecum    2. Localized enlarged lymph nodes          1.Stage I Colon Cancer of cecum - No adjuvant chemotherapy indicated. CT shows stable pulmonary nodule & nonspecific nodes. Will monitor closely. CEA normal.   He is aware children need to start colonoscopy at 39yo.   Discussed genetics counseling - they do not know specific types of cancer.     2. Pulmonary nodule- stable on CT, will cont to monitor.     3. LAD - stable    Generally routine scans are not typical for stage I disease, but will consider repeating  scans to eval for stability of findings    PLAN:  Patient with no clinical evidence of disease at this time.All findings stable.  We will continue surveillance in the meantime.      - RTC 6 months with MD for CT CAP  - CBC,CMP, CEA- same day as CT @ [facility]      Pt advised to call in the interim if anything concerning including but not limited to - fevers, night sweats, bleeding, new or unexplained pain or weight loss, changes in BM.     Discussion:     I have explained all of the above in detail and the patient understands all of the current recommendation(s). I have answered all of their questions to the best of my ability and to their complete satisfaction.   The patient is to  continue with the current management plan.      ARMANDO GaliciaP-C  Oncology/Hematology  [facility]

## 2024-10-29 DIAGNOSIS — H70.10 CHRONIC MASTOIDITIS, UNSPECIFIED EAR: Primary | ICD-10-CM

## 2025-02-06 ENCOUNTER — OFFICE VISIT (OUTPATIENT)
Dept: OTOLARYNGOLOGY | Facility: CLINIC | Age: 70
End: 2025-02-06
Payer: MEDICARE

## 2025-02-06 VITALS
RESPIRATION RATE: 20 BRPM | HEIGHT: 69 IN | BODY MASS INDEX: 25.92 KG/M2 | DIASTOLIC BLOOD PRESSURE: 68 MMHG | OXYGEN SATURATION: 99 % | WEIGHT: 175 LBS | SYSTOLIC BLOOD PRESSURE: 103 MMHG | HEART RATE: 73 BPM | TEMPERATURE: 97 F

## 2025-02-06 DIAGNOSIS — H66.3X1 CHRONIC SUPPURATIVE OTITIS MEDIA OF RIGHT EAR, UNSPECIFIED OTITIS MEDIA LOCATION: ICD-10-CM

## 2025-02-06 DIAGNOSIS — H70.11 CHRONIC MASTOIDITIS OF RIGHT SIDE: ICD-10-CM

## 2025-02-06 DIAGNOSIS — H91.90 HEARING LOSS, UNSPECIFIED HEARING LOSS TYPE, UNSPECIFIED LATERALITY: ICD-10-CM

## 2025-02-06 DIAGNOSIS — H72.91 TYMPANIC MEMBRANE PERFORATION, RIGHT: Primary | ICD-10-CM

## 2025-02-06 PROCEDURE — 99215 OFFICE O/P EST HI 40 MIN: CPT | Mod: PBBFAC | Performed by: OTOLARYNGOLOGY

## 2025-02-06 RX ORDER — CIPROFLOXACIN AND DEXAMETHASONE 3; 1 MG/ML; MG/ML
4 SUSPENSION/ DROPS AURICULAR (OTIC) 2 TIMES DAILY
Qty: 7.5 ML | Refills: 0 | Status: SHIPPED | OUTPATIENT
Start: 2025-02-06 | End: 2025-02-16

## 2025-02-06 RX ORDER — IPRATROPIUM BROMIDE 21 UG/1
2 SPRAY, METERED NASAL 2 TIMES DAILY
Qty: 30 ML | Refills: 0 | Status: SHIPPED | OUTPATIENT
Start: 2025-02-06

## 2025-02-06 NOTE — PROGRESS NOTES
I have reviewed and agree with the resident's findings, including all diagnostic interpretations and plans as written.     Lalo Ricahrdson M.D.

## 2025-02-06 NOTE — PROGRESS NOTES
[facility] Otolaryngology   Clinic Note    Eleazar Mark  Encounter Date: 2025   YOB: 1955  Physician: Ana Brown    CC: hearing loss, otorrhea     HPI: Eleazar Mark is a 69 y.o. male who presents with chronic otorrhea. About 10 years ago, his doctor noted incidental R-sided TM perforation. Then about 2 years ago, patient experienced progressive hearing loss. Recent audiogram no available for review. He did receive hearing aids but felt like they did not help much. In 2024, patient presented to Dr. Coulter with R-sided otorrhea. He was placed on otic and PO antibiotics and referred to our clinic for further management. His infection did not clear after completing medications, and he's had intermittent purulent otorrhea for the past few months. CT temporal bone on 10/8 confirmed R-sided middle ear/mastoid effusion with apparent erosion of incus and stapes. Patient denies otalgia or dizziness. No issues with the L ear. No history of recurrent ear infections as a child. No history of ear tubes or other H&N surgery. Unrelated, he complains of clear drainage from his nose that has not resolved with multiple nasal sprays (although unsure which ones he's tried).     ROS: Negative except as stated in HPI.      Review of patient's allergies indicates:   Allergen Reactions    Cipro [ciprofloxacin hcl]     Penicillins        Past Medical History:   Diagnosis Date    Colon cancer     Essential (primary) hypertension     Heart disease     Mixed hyperlipidemia        Past Surgical History:   Procedure Laterality Date    COLON RESECTION      HAND SURGERY Right        Social History     Socioeconomic History    Marital status:    Tobacco Use    Smoking status: Former     Current packs/day: 0.00     Types: Cigarettes     Quit date:      Years since quittin.1    Smokeless tobacco: Never   Substance and Sexual Activity    Alcohol use: Yes     Alcohol/week: 6.0 standard drinks of alcohol      Types: 6 Cans of beer per week    Drug use: Never       Family History   Problem Relation Name Age of Onset    Heart failure Mother      Cancer Father      Cancer Brother         Outpatient Encounter Medications as of 2/6/2025   Medication Sig Dispense Refill    aspirin 81 MG Chew Take 81 mg by mouth once daily.      atorvastatin (LIPITOR) 80 MG tablet Take 80 mg by mouth once daily.      cetirizine (ZYRTEC) 10 MG tablet Take 10 mg by mouth once daily.      clindamycin (CLEOCIN T) 1 % lotion apply to underams & right lower back TWICE DAILY until clear      cyanocobalamin (VITAMIN B-12) 1000 MCG tablet take one tablet by mouth EVERY DAY 30 tablet 3    dapagliflozin (FARXIGA) 5 mg Tab tablet Take 5 mg by mouth once daily.      dupilumab (DUPIXENT PEN) 200 mg/1.14 mL PnIj Inject 200 mg into the skin every 14 (fourteen) days.      ENTRESTO  mg per tablet Take 1 tablet by mouth 2 (two) times daily.      fluticasone propionate (FLOVENT DISKUS) 50 mcg/actuation DsDv Inhale into the lungs. Controller      folic acid (FOLVITE) 1 MG tablet Take 1 tablet (1,000 mcg total) by mouth once daily. 30 tablet 3    hydrOXYzine HCL (ATARAX) 25 MG tablet Take 1 tablet by mouth once daily.      isosorbide mononitrate (IMDUR) 30 MG 24 hr tablet Take 30 mg by mouth once daily.      JARDIANCE 10 mg tablet TAKE 1 TABLET DAILY FOR DIABETES      metFORMIN (GLUCOPHAGE) 1000 MG tablet Take 1,000 mg by mouth once daily.      metoprolol succinate (TOPROL-XL) 200 MG 24 hr tablet Take 200 mg by mouth once daily.      montelukast (SINGULAIR) 10 mg tablet Take 10 mg by mouth daily as needed.      neomycin-polymyxin-dexamethasone (DEXACINE) 3.5 mg/g-10,000 unit/g-0.1 % Oint APPLY THIN LAYER TO LOWER EYELID ON BOTH EYES EVERY EVENING AT BEDTIME FOR TEN DAYS      pantoprazole (PROTONIX) 40 MG tablet Take 40 mg by mouth once daily.      ciprofloxacin-dexAMETHasone 0.3-0.1% (CIPRODEX) 0.3-0.1 % DrpS Place 4 drops into the right ear 2 (two) times  daily. for 10 days 7.5 mL 0    ipratropium (ATROVENT) 21 mcg (0.03 %) nasal spray 2 sprays by Each Nostril route 2 (two) times daily. 30 mL 0     No facility-administered encounter medications on file as of 2/6/2025.         PHYSICAL EXAM:  Vitals:    02/06/25 1016   BP: 103/68   Pulse: 73   Resp: 20   Temp: 97.4 °F (36.3 °C)       General Appearance: well nourished, well-developed, alert, oriented, in no acute distress  Head/Face: NC, AT  Eyes: PEERLA, EOMI, normal conjunctiva  Ears: Hears well at normal conversation volume  AD: external normal, ear canal normal with moderate erythema along the medial aspect, TM with approximately 70% perforation that appears well-healed, the remaining TM is thickened and erythematous; beyond this, the promontory is visualized but no ossicles; scant purulent drainage along the annulus  AS: external normal, ear canal normal, TM intact, no middle ear fluid  Nose: septum midline, no inferior turbinate hypertrophy, no polyps  OC/OP: dentition moderate, no oral lesions, FOM and BOT soft  Neck: soft, non-tender, no palpable lymph nodes, thyroid- no nodules or goiter  Neuro: CN II - XII grossly intact  Psychiatric: oriented to time, place and person, no depression, anxiety or agitation      PERTINENT DATA:  CT temporal bone (10/8/24):  FINDINGS:  Right ear: Thickening and slight narrowing along the external auditory canal.  Retraction of the tympanic membrane.  Near complete filling of the middle ear cavity with some sparing of a small area in the epitympanic recess.  The superior portion of the malleus demonstrates normal position and morphology.  The middle ear ossicles are otherwise obscured, however no obvious erosive changes.  Scutum intact.  Mastoid air cells completely filled otherwise.     Inner ear structures including the cochlea, semicircular canals, vestibule, internal auditory canal, cochlear and vestibular aqueducts, carotid canal jugular bulb appear normal.     Left ear:  External auditory canal normally aerated.  Middle ear cavity normally aerated.  Middle ear ossicles normal morphology and position.  Scutum intact.  No erosion or fistula evident.  No fluid seen.  Mastoid air cells normally aerated.     Inner ear structures including cochlea, vestibule, semicircular canals, internal auditory canal, aqueducts, carotid canal and jugular bulb are normal.     Regional soft tissues of the skull base, facial region, intracranial vault normal.     Impression:  Near complete filling of the right middle ear cavity with retraction of the tympanic membrane, relative sparing of a small area involving the epitympanic recess, consistent with otitis media.  No obvious erosion although the middle ear ossicles inferior to the malleolus head are obscured.  Mild mucosal thickening along the external auditory canal noted.  Complete right mastoid effusion.     Electronically signed by: Dashawn Yan MD  Date:                                            10/12/2024  Time:                                           07:30      ASSESSMENT:  Eleazar Mark is a 69 y.o. male with R-sided COM and TM perforation. Also with significant hearing loss that has not improved with previous hearing aid use.     PLAN:  -- Ciprodex drops to the R ear for 10 days (of note, patient had previous skin reaction to Cipro and was instructed to discontinue drops immediately if this recurs)  -- Dry ear precautions  -- Will repeat audiogram     RTC in about 2 weeks with repeat audio; once infection has cleared, will discuss possible tympanoplasty       Ana Brown, PGY3  [facility] Otolaryngology   02/06/2025 11:25 AM

## 2025-02-13 ENCOUNTER — TELEPHONE (OUTPATIENT)
Dept: OTOLARYNGOLOGY | Facility: CLINIC | Age: 70
End: 2025-02-13

## 2025-02-13 ENCOUNTER — TELEPHONE (OUTPATIENT)
Dept: OTOLARYNGOLOGY | Facility: CLINIC | Age: 70
End: 2025-02-13
Payer: MEDICARE

## 2025-02-13 NOTE — TELEPHONE ENCOUNTER
----- Message from Randy sent at 2/10/2025  3:37 PM CST -----  Good Afternoon,     Pt Eleazar wife just called stating he still doesn't have his ear drops. The insurance company told the wife they sent something to us fill out for the medicine. Wife is stating Mr. Bush is needing the ear drops ASAP.    Thank You

## 2025-02-18 ENCOUNTER — CLINICAL SUPPORT (OUTPATIENT)
Dept: AUDIOLOGY | Facility: HOSPITAL | Age: 70
End: 2025-02-18
Payer: MEDICARE

## 2025-02-18 ENCOUNTER — OFFICE VISIT (OUTPATIENT)
Dept: OTOLARYNGOLOGY | Facility: CLINIC | Age: 70
End: 2025-02-18
Payer: MEDICARE

## 2025-02-18 VITALS
WEIGHT: 175 LBS | BODY MASS INDEX: 25.84 KG/M2 | TEMPERATURE: 97 F | DIASTOLIC BLOOD PRESSURE: 72 MMHG | SYSTOLIC BLOOD PRESSURE: 104 MMHG | HEART RATE: 69 BPM

## 2025-02-18 DIAGNOSIS — H90.A31 MIXED CONDUCTIVE AND SENSORINEURAL HEARING LOSS OF RIGHT EAR WITH RESTRICTED HEARING OF LEFT EAR: Primary | ICD-10-CM

## 2025-02-18 DIAGNOSIS — H70.11 CHRONIC MASTOIDITIS OF RIGHT SIDE: Primary | ICD-10-CM

## 2025-02-18 DIAGNOSIS — H72.91 TYMPANIC MEMBRANE PERFORATION, RIGHT: ICD-10-CM

## 2025-02-18 DIAGNOSIS — H91.90 HEARING LOSS, UNSPECIFIED HEARING LOSS TYPE, UNSPECIFIED LATERALITY: ICD-10-CM

## 2025-02-18 PROCEDURE — 69210 REMOVE IMPACTED EAR WAX UNI: CPT | Mod: 50,PBBFAC

## 2025-02-18 PROCEDURE — 99214 OFFICE O/P EST MOD 30 MIN: CPT | Mod: PBBFAC,25 | Performed by: OTOLARYNGOLOGY

## 2025-02-18 PROCEDURE — 92567 TYMPANOMETRY: CPT | Performed by: AUDIOLOGIST

## 2025-02-18 PROCEDURE — 92557 COMPREHENSIVE HEARING TEST: CPT | Performed by: AUDIOLOGIST

## 2025-02-18 PROCEDURE — 87077 CULTURE AEROBIC IDENTIFY: CPT | Performed by: OTOLARYNGOLOGY

## 2025-02-18 NOTE — PROGRESS NOTES
Hearing Evaluation        Patient History: Mr. Mark reports a long-standing hearing loss with recent decrease in right ear from middle ear involvement.  Drainage and otalgia has recently subsided.  Vertigo is denied. All additional history is unremarkable.        Test Results:                    Pure Tone Testing:      Right ear:       Severe to profound mixed hearing loss from 250-8kHz. Speech could not be tested at equipment limitations.        Left ear:          Moderately severe to profound sensorineural hearing loss from 250-8kHz. Speech reception threshold is in agreement with puretone findings.  Discrimination score of 44% is considered poor.                                                                            Tympanometry:                                           Right ear:       Type 'B' tymp, normal (1.96mL) volume     Left ear:          Type 'Ad' tymp, hypercompliant TM mobility (5.40mL compliance)                                           Interpretations:      Behavioral test findings indicate an asymmetric hearing loss (right mixed>left SNHL). Speech reception thresholds obtained at 65dB, AS, and is in agreement with puretone findings. Speech discrimination scores of 44%, AS, are considered poor. No responses obtained at equipment limitations for speech testing in the right ear Immittance measures indicate abnormal/absent TM mobility with normal volume in the right ear and hyper-compliant TM mobility in the left ear.           Recommendations:   Continue with ENT follow up  2.   Annual hearing evaluations  3.   Binaural amplification (Information on [place] [employer] given)

## 2025-02-18 NOTE — PROGRESS NOTES
[facility]   Clinic Note    Eleazar Mark  Encounter Date: 2/18/2025   YOB: 1955  Physician: Ana Brown    CC: hearing loss, otorrhea     HPI: Eleazar Mark is a 69 y.o. male who presents with chronic otorrhea. About 10 years ago, his doctor noted incidental R-sided TM perforation. Then about 2 years ago, patient experienced progressive hearing loss. Recent audiogram not available for review. He did receive hearing aids but felt like they did not help much. In October 2024, patient presented to Dr. Coulter with R-sided otorrhea. He was placed on otic and PO antibiotics and referred to our clinic for further management. His infection did not clear after completing medications, and he's had intermittent purulent otorrhea for the past few months. CT temporal bone on 10/8 confirmed R-sided middle ear/mastoid effusion with apparent erosion of incus and stapes. Patient denies otalgia or dizziness. No issues with the L ear. No history of recurrent ear infections as a child. No history of ear tubes or other H&N surgery. Unrelated, he complains of clear drainage from his nose that has not resolved with multiple nasal sprays (although unsure which ones he's tried).     2/18/25: Patient presents for follow up. Audiogram today shows bilateral severe to profound hearing loss. Had trouble getting ciprodex drops approved by insurance so has only used for 5 days. Still with purulence drainage on the R side.     ROS: Negative except as stated in HPI.      Review of patient's allergies indicates:   Allergen Reactions    Cipro [ciprofloxacin hcl]     Penicillins        Past Medical History:   Diagnosis Date    Colon cancer     Essential (primary) hypertension     Heart disease     Mixed hyperlipidemia        Past Surgical History:   Procedure Laterality Date    COLON RESECTION      HAND SURGERY Right        Social History     Socioeconomic History    Marital status:    Tobacco Use    Smoking  status: Former     Current packs/day: 0.00     Types: Cigarettes     Quit date:      Years since quittin.1    Smokeless tobacco: Never   Substance and Sexual Activity    Alcohol use: Yes     Alcohol/week: 6.0 standard drinks of alcohol     Types: 6 Cans of beer per week    Drug use: Never       Family History   Problem Relation Name Age of Onset    Heart failure Mother      Cancer Father      Cancer Brother         Outpatient Encounter Medications as of 2025   Medication Sig Dispense Refill    aspirin 81 MG Chew Take 81 mg by mouth once daily.      atorvastatin (LIPITOR) 80 MG tablet Take 80 mg by mouth once daily.      cetirizine (ZYRTEC) 10 MG tablet Take 10 mg by mouth once daily.      [] ciprofloxacin-dexAMETHasone 0.3-0.1% (CIPRODEX) 0.3-0.1 % DrpS Place 4 drops into the right ear 2 (two) times daily. for 10 days 7.5 mL 0    clindamycin (CLEOCIN T) 1 % lotion apply to underams & right lower back TWICE DAILY until clear      cyanocobalamin (VITAMIN B-12) 1000 MCG tablet take one tablet by mouth EVERY DAY 30 tablet 3    dapagliflozin (FARXIGA) 5 mg Tab tablet Take 5 mg by mouth once daily.      dupilumab (DUPIXENT PEN) 200 mg/1.14 mL PnIj Inject 200 mg into the skin every 14 (fourteen) days.      ENTRESTO  mg per tablet Take 1 tablet by mouth 2 (two) times daily.      fluticasone propionate (FLOVENT DISKUS) 50 mcg/actuation DsDv Inhale into the lungs. Controller (Patient not taking: Reported on 2025)      folic acid (FOLVITE) 1 MG tablet Take 1 tablet (1,000 mcg total) by mouth once daily. 30 tablet 3    hydrOXYzine HCL (ATARAX) 25 MG tablet Take 1 tablet by mouth once daily.      ipratropium (ATROVENT) 21 mcg (0.03 %) nasal spray 2 sprays by Each Nostril route 2 (two) times daily. 30 mL 0    isosorbide mononitrate (IMDUR) 30 MG 24 hr tablet Take 30 mg by mouth once daily. (Patient not taking: Reported on 2025)      JARDIANCE 10 mg tablet TAKE 1 TABLET DAILY FOR DIABETES       metFORMIN (GLUCOPHAGE) 1000 MG tablet Take 1,000 mg by mouth once daily. (Patient not taking: Reported on 2/18/2025)      metoprolol succinate (TOPROL-XL) 200 MG 24 hr tablet Take 200 mg by mouth once daily.      montelukast (SINGULAIR) 10 mg tablet Take 10 mg by mouth daily as needed.      neomycin-polymyxin-dexamethasone (DEXACINE) 3.5 mg/g-10,000 unit/g-0.1 % Oint APPLY THIN LAYER TO LOWER EYELID ON BOTH EYES EVERY EVENING AT BEDTIME FOR TEN DAYS (Patient not taking: Reported on 2/18/2025)      pantoprazole (PROTONIX) 40 MG tablet Take 40 mg by mouth once daily.       No facility-administered encounter medications on file as of 2/18/2025.         PHYSICAL EXAM:  Vitals:    02/18/25 1002   BP: 104/72   Pulse: 69   Temp: 97.4 °F (36.3 °C)       General Appearance: well nourished, well-developed, alert, oriented, in no acute distress  Head/Face: NC, AT  Eyes: PEERLA, EOMI, normal conjunctiva  Ears: Does not hear well at normal conversation volume  AD: external normal, EAC normal but thick purulent drainage emanating from the middle ear (cultured in clinic today), TM with approximately 70% perforation that appears well-healed, the remaining TM is thickened and erythematous; beyond this, the promontory is visualized but no ossicles; auto insufflation with free air escaping through TM perforation  AS: external normal, ear canal normal, TM retracted but able to auto insufflate, no middle ear fluid  Nose: septum midline, no inferior turbinate hypertrophy, no polyps  OC/OP: dentition moderate, no oral lesions, FOM and BOT soft  Neck: soft, non-tender, no palpable lymph nodes, thyroid- no nodules or goiter  Neuro: CN II - XII grossly intact  Psychiatric: oriented to time, place and person, no depression, anxiety or agitation      PERTINENT DATA:  Audiogram (2/18/25):    Pure Tone Testing:      Right ear:       Severe to profound mixed hearing loss from 250-8kHz. Speech could not be tested at equipment limitations.         Left ear:          Moderately severe to profound sensorineural hearing loss from 250-8kHz. Speech reception threshold is in agreement with puretone findings.  Discrimination score of 44% is considered poor.                                                                Tympanometry:                                           Right ear:       Type 'B' tymp, normal (1.96mL) volume     Left ear:          Type 'Ad' tymp, hypercompliant TM mobility (5.40mL compliance)      CT temporal bone (10/8/24):  FINDINGS:  Right ear: Thickening and slight narrowing along the external auditory canal.  Retraction of the tympanic membrane.  Near complete filling of the middle ear cavity with some sparing of a small area in the epitympanic recess.  The superior portion of the malleus demonstrates normal position and morphology.  The middle ear ossicles are otherwise obscured, however no obvious erosive changes.  Scutum intact.  Mastoid air cells completely filled otherwise.     Inner ear structures including the cochlea, semicircular canals, vestibule, internal auditory canal, cochlear and vestibular aqueducts, carotid canal jugular bulb appear normal.     Left ear: External auditory canal normally aerated.  Middle ear cavity normally aerated.  Middle ear ossicles normal morphology and position.  Scutum intact.  No erosion or fistula evident.  No fluid seen.  Mastoid air cells normally aerated.     Inner ear structures including cochlea, vestibule, semicircular canals, internal auditory canal, aqueducts, carotid canal and jugular bulb are normal.     Regional soft tissues of the skull base, facial region, intracranial vault normal.     Impression:  Near complete filling of the right middle ear cavity with retraction of the tympanic membrane, relative sparing of a small area involving the epitympanic recess, consistent with otitis media.  No obvious erosion although the middle ear ossicles inferior to the malleolus head are obscured.   Mild mucosal thickening along the external auditory canal noted.  Complete right mastoid effusion.     Electronically signed by: Dashawn Yan MD  Date:                                            10/12/2024  Time:                                           07:30      ASSESSMENT:  Eleazar Mark is a 69 y.o. male with R-sided COM and TM perforation. Also with significant hearing loss that has not improved with previous hearing aid use.     PLAN:  -- Continue Ciprodex drops to the R ear for 10 days total   -- Dry ear precautions  -- Call patient with cultures results   -- Will discuss surgical options once infection has cleared    RTC in about 2 weeks for symptom check      Ana Brown, PGY3  [facility]   02/18/2025 11:25 AM

## 2025-02-19 NOTE — PROGRESS NOTES
I have reviewed and agree with the resident's findings, including all diagnostic interpretations and plans as written.     Lalo Richardson M.D.

## 2025-02-22 LAB — BACTERIA DEEP WND CULT: ABNORMAL

## 2025-03-06 ENCOUNTER — TELEPHONE (OUTPATIENT)
Dept: OTOLARYNGOLOGY | Facility: CLINIC | Age: 70
End: 2025-03-06
Payer: MEDICARE

## 2025-03-13 ENCOUNTER — OFFICE VISIT (OUTPATIENT)
Dept: OTOLARYNGOLOGY | Facility: CLINIC | Age: 70
End: 2025-03-13
Payer: MEDICARE

## 2025-03-13 VITALS
OXYGEN SATURATION: 99 % | BODY MASS INDEX: 25.93 KG/M2 | WEIGHT: 175.06 LBS | RESPIRATION RATE: 18 BRPM | TEMPERATURE: 98 F | SYSTOLIC BLOOD PRESSURE: 118 MMHG | HEART RATE: 96 BPM | DIASTOLIC BLOOD PRESSURE: 77 MMHG | HEIGHT: 69 IN

## 2025-03-13 DIAGNOSIS — H72.91 TYMPANIC MEMBRANE PERFORATION, RIGHT: Primary | ICD-10-CM

## 2025-03-13 DIAGNOSIS — H66.3X1 CHRONIC SUPPURATIVE OTITIS MEDIA OF RIGHT EAR, UNSPECIFIED OTITIS MEDIA LOCATION: ICD-10-CM

## 2025-03-13 DIAGNOSIS — H91.90 HEARING LOSS, UNSPECIFIED HEARING LOSS TYPE, UNSPECIFIED LATERALITY: ICD-10-CM

## 2025-03-13 PROCEDURE — 99215 OFFICE O/P EST HI 40 MIN: CPT | Mod: PBBFAC | Performed by: OTOLARYNGOLOGY

## 2025-03-13 NOTE — PROGRESS NOTES
[facility]   Clinic Note    Eleazar Mark  Encounter Date: 3/13/2025   YOB: 1955  Physician: Ana Brown    CC: hearing loss, otorrhea     HPI: Eleazar Mark is a 69 y.o. male who presents with chronic otorrhea. About 10 years ago, his doctor noted incidental R-sided TM perforation. Then about 2 years ago, patient experienced progressive hearing loss. Recent audiogram not available for review. He did receive hearing aids but felt like they did not help much. In October 2024, patient presented to Dr. Coulter with R-sided otorrhea. He was placed on otic and PO antibiotics and referred to our clinic for further management. His infection did not clear after completing medications, and he's had intermittent purulent otorrhea for the past few months. CT temporal bone on 10/8 confirmed R-sided middle ear/mastoid effusion with apparent erosion of incus and stapes. Patient denies otalgia or dizziness. No issues with the L ear. No history of recurrent ear infections as a child. No history of ear tubes or other H&N surgery. Unrelated, he complains of clear drainage from his nose that has not resolved with multiple nasal sprays (although unsure which ones he's tried).     2/18/25: Patient presents for follow up. Audiogram today shows bilateral severe to profound hearing loss. Had trouble getting ciprodex drops approved by insurance so has only used for 5 days. Still with purulence drainage on the R side.     3/13/25: Patient presents for follow up. Otorrhea resolved about 2 days after his last visit. Completed 10-day course of Ciprodex drops and following dry ear precautions.       ROS: Negative except as stated in HPI.      Review of patient's allergies indicates:   Allergen Reactions    Cipro [ciprofloxacin hcl]     Penicillins        Past Medical History:   Diagnosis Date    Colon cancer     Essential (primary) hypertension     Heart disease     Mixed hyperlipidemia        Past Surgical History:    Procedure Laterality Date    COLON RESECTION      HAND SURGERY Right        Social History     Socioeconomic History    Marital status:    Tobacco Use    Smoking status: Former     Current packs/day: 0.00     Types: Cigarettes     Quit date:      Years since quittin.2    Smokeless tobacco: Never   Substance and Sexual Activity    Alcohol use: Yes     Alcohol/week: 6.0 standard drinks of alcohol     Types: 6 Cans of beer per week    Drug use: Never       Family History   Problem Relation Name Age of Onset    Heart failure Mother      Cancer Father      Cancer Brother         Outpatient Encounter Medications as of 3/13/2025   Medication Sig Dispense Refill    aspirin 81 MG Chew Take 81 mg by mouth once daily.      atorvastatin (LIPITOR) 80 MG tablet Take 80 mg by mouth once daily.      cetirizine (ZYRTEC) 10 MG tablet Take 10 mg by mouth once daily.      clindamycin (CLEOCIN T) 1 % lotion apply to underams & right lower back TWICE DAILY until clear      cyanocobalamin (VITAMIN B-12) 1000 MCG tablet take one tablet by mouth EVERY DAY 30 tablet 3    dapagliflozin (FARXIGA) 5 mg Tab tablet Take 5 mg by mouth once daily.      dupilumab (DUPIXENT PEN) 200 mg/1.14 mL PnIj Inject 200 mg into the skin every 14 (fourteen) days.      ENTRESTO  mg per tablet Take 1 tablet by mouth 2 (two) times daily.      fluticasone propionate (FLOVENT DISKUS) 50 mcg/actuation DsDv Inhale into the lungs. Controller (Patient not taking: Reported on 2025)      folic acid (FOLVITE) 1 MG tablet Take 1 tablet (1,000 mcg total) by mouth once daily. 30 tablet 3    hydrOXYzine HCL (ATARAX) 25 MG tablet Take 1 tablet by mouth once daily.      ipratropium (ATROVENT) 21 mcg (0.03 %) nasal spray 2 sprays by Each Nostril route 2 (two) times daily. 30 mL 0    isosorbide mononitrate (IMDUR) 30 MG 24 hr tablet Take 30 mg by mouth once daily. (Patient not taking: Reported on 2025)      JARDIANCE 10 mg tablet TAKE 1 TABLET  DAILY FOR DIABETES      metFORMIN (GLUCOPHAGE) 1000 MG tablet Take 1,000 mg by mouth once daily. (Patient not taking: Reported on 2/18/2025)      metoprolol succinate (TOPROL-XL) 200 MG 24 hr tablet Take 200 mg by mouth once daily.      montelukast (SINGULAIR) 10 mg tablet Take 10 mg by mouth daily as needed.      neomycin-polymyxin-dexamethasone (DEXACINE) 3.5 mg/g-10,000 unit/g-0.1 % Oint APPLY THIN LAYER TO LOWER EYELID ON BOTH EYES EVERY EVENING AT BEDTIME FOR TEN DAYS (Patient not taking: Reported on 2/18/2025)      pantoprazole (PROTONIX) 40 MG tablet Take 40 mg by mouth once daily.       No facility-administered encounter medications on file as of 3/13/2025.         PHYSICAL EXAM:  Vitals:    03/13/25 1504   BP: 118/77   Pulse: 96   Resp: 18   Temp: 98.4 °F (36.9 °C)       General Appearance: well nourished, well-developed, alert, oriented, in no acute distress  Head/Face: NC, AT  Eyes: PEERLA, EOMI, normal conjunctiva  Ears: Does not hear well at normal conversation volume  AD: external normal, EAC without purulent otorrhea, TM with approximately 70% perforation that appears matured, the remaining TM is thickened and erythematous; beyond this, the promontory is visualized but no ossicles; auto insufflation with free air escaping through TM perforation, mucoid drainage emanating from attic  AS: external normal, ear canal normal, TM retracted but able to auto insufflate, no middle ear fluid  Nose: septum midline, no inferior turbinate hypertrophy, no polyps  OC/OP: dentition moderate, no oral lesions, FOM and BOT soft  Neck: soft, non-tender, no palpable lymph nodes, thyroid- no nodules or goiter  Neuro: CN II - XII grossly intact  Psychiatric: oriented to time, place and person, no depression, anxiety or agitation      PERTINENT DATA:  Audiogram (2/18/25):    Pure Tone Testing:      Right ear:       Severe to profound mixed hearing loss from 250-8kHz. Speech could not be tested at equipment limitations.         Left ear:          Moderately severe to profound sensorineural hearing loss from 250-8kHz. Speech reception threshold is in agreement with puretone findings.  Discrimination score of 44% is considered poor.                                                                Tympanometry:                                           Right ear:       Type 'B' tymp, normal (1.96mL) volume     Left ear:          Type 'Ad' tymp, hypercompliant TM mobility (5.40mL compliance)      CT temporal bone (10/8/24):  FINDINGS:  Right ear: Thickening and slight narrowing along the external auditory canal.  Retraction of the tympanic membrane.  Near complete filling of the middle ear cavity with some sparing of a small area in the epitympanic recess.  The superior portion of the malleus demonstrates normal position and morphology.  The middle ear ossicles are otherwise obscured, however no obvious erosive changes.  Scutum intact.  Mastoid air cells completely filled otherwise.     Inner ear structures including the cochlea, semicircular canals, vestibule, internal auditory canal, cochlear and vestibular aqueducts, carotid canal jugular bulb appear normal.     Left ear: External auditory canal normally aerated.  Middle ear cavity normally aerated.  Middle ear ossicles normal morphology and position.  Scutum intact.  No erosion or fistula evident.  No fluid seen.  Mastoid air cells normally aerated.     Inner ear structures including cochlea, vestibule, semicircular canals, internal auditory canal, aqueducts, carotid canal and jugular bulb are normal.     Regional soft tissues of the skull base, facial region, intracranial vault normal.     Impression:  Near complete filling of the right middle ear cavity with retraction of the tympanic membrane, relative sparing of a small area involving the epitympanic recess, consistent with otitis media.  No obvious erosion although the middle ear ossicles inferior to the malleolus head are obscured.   Mild mucosal thickening along the external auditory canal noted.  Complete right mastoid effusion.     Electronically signed by: Dashawn Yan MD  Date:                                            10/12/2024  Time:                                           07:30      ASSESSMENT:  Eleazar Mark is a 69 y.o. male with R-sided COM and TM perforation. Also with significant hearing loss that has not improved with previous hearing aid use.     PLAN:  -- Repeat CT temporal bone   -- Dry ear precautions  -- Will discuss surgical options once infection has cleared    RTC in about 4 weeks to discuss CT results       Ana Brown, PGY3  [facility]   03/13/2025 11:25 AM

## 2025-03-21 ENCOUNTER — HOSPITAL ENCOUNTER (OUTPATIENT)
Dept: RADIOLOGY | Facility: HOSPITAL | Age: 70
Discharge: HOME | End: 2025-03-21
Payer: MEDICARE

## 2025-03-21 DIAGNOSIS — H66.3X1 CHRONIC SUPPURATIVE OTITIS MEDIA OF RIGHT EAR, UNSPECIFIED OTITIS MEDIA LOCATION: ICD-10-CM

## 2025-03-21 PROCEDURE — 70480 CT ORBIT/EAR/FOSSA W/O DYE: CPT | Mod: TC

## 2025-04-14 NOTE — PROGRESS NOTES
[facility] Hematology/Oncology  PROGRESS NOTE      Subjective:         NAME: Eleazar Mark : 1955     69 y.o. male   MRN: [id number]    Referring Doc: No ref. provider found        Chief Complaint:    Chief Complaint   Patient presents with    Cancer of cecum     No complaints.        Oncology/Hematology History:  Oncology History   Cancer of cecum   3/21/2023 Initial Diagnosis    Cancer of cecum     10/17/2023 Cancer Staged    Staging form: Colon and Rectum, AJCC 8th Edition  - Clinical: Stage I (cT1, cN0, cM0)     Stage I Colon Cancer   - unresectable polyp on colonoscopy   - R hemicolectomy 19 - adenocarcinoma, grade 1, 0.4cm, margins uninvolved; 0/7 lymph nodes involved; T1N0Mx       Interval History:  He had colonoscopy on 3/24/23 with Dr. Jose Carmen.    Mr. Mark is seen today for a 6 month follow-up for scan results, accompanied by his wife. He feels good and denies any new complaints. He is scheduled for tympanoplasty w/ mastoidectomy on 25 with Dr. Richardson. He denies any blood in his stools or black stools. He denies any fevers, headache, cough, shortness of breath, chest pain, abdominal pain, swelling. He denies any bleeding whatsoever.  He has a stable lung nodule and mildly prominent abdominal lymph nodes.     ROS:   GEN: normal without any fever, night sweats or weight loss  HEENT: normal with no HA's, sore throat, stiff neck, changes in vision  CV: normal with no CP, SOB, PND, LEMONS or orthopnea  PULM: normal with no SOB, cough, hemoptysis, sputum or pleuritic pain  GI: normal with no abdominal pain, nausea, vomiting, constipation, diarrhea, melanotic stools, BRBPR, or hematemesis  : normal with no hematuria, dysuria  BREAST: normal with no mass, discharge, pain  SKIN: normal with no rash, erythema, bruising, or swelling    Allergies:  Review of patient's allergies indicates:   Allergen Reactions    Cipro [ciprofloxacin hcl]     Penicillins        Medications:    Current Outpatient  Medications:     aspirin 81 MG Chew, Take 81 mg by mouth once daily., Disp: , Rfl:     atorvastatin (LIPITOR) 80 MG tablet, Take 80 mg by mouth once daily., Disp: , Rfl:     cetirizine (ZYRTEC) 10 MG tablet, Take 10 mg by mouth once daily., Disp: , Rfl:     clindamycin (CLEOCIN T) 1 % lotion, apply to underams & right lower back TWICE DAILY until clear, Disp: , Rfl:     clotrimazole (LOTRIMIN) 1 % Soln, Four drops in the affected ear b.i.d. for 14 days., Disp: 10 mL, Rfl: 0    cyanocobalamin (VITAMIN B-12) 1000 MCG tablet, take one tablet by mouth EVERY DAY, Disp: 30 tablet, Rfl: 3    dapagliflozin (FARXIGA) 5 mg Tab tablet, Take 5 mg by mouth once daily., Disp: , Rfl:     dupilumab (DUPIXENT PEN) 200 mg/1.14 mL PnIj, Inject 200 mg into the skin every 14 (fourteen) days., Disp: , Rfl:     ENTRESTO  mg per tablet, Take 1 tablet by mouth 2 (two) times daily., Disp: , Rfl:     fluticasone propionate (FLOVENT DISKUS) 50 mcg/actuation DsDv, Inhale into the lungs. Controller, Disp: , Rfl:     folic acid (FOLVITE) 1 MG tablet, Take 1 tablet (1,000 mcg total) by mouth once daily., Disp: 30 tablet, Rfl: 3    hydrOXYzine HCL (ATARAX) 25 MG tablet, Take 1 tablet by mouth once daily., Disp: , Rfl:     ipratropium (ATROVENT) 21 mcg (0.03 %) nasal spray, 2 sprays by Each Nostril route 2 (two) times daily., Disp: 30 mL, Rfl: 0    isosorbide mononitrate (IMDUR) 30 MG 24 hr tablet, Take 30 mg by mouth once daily., Disp: , Rfl:     JARDIANCE 10 mg tablet, TAKE 1 TABLET DAILY FOR DIABETES, Disp: , Rfl:     metoprolol succinate (TOPROL-XL) 200 MG 24 hr tablet, Take 200 mg by mouth once daily., Disp: , Rfl:     montelukast (SINGULAIR) 10 mg tablet, Take 10 mg by mouth daily as needed., Disp: , Rfl:     pantoprazole (PROTONIX) 40 MG tablet, Take 40 mg by mouth once daily., Disp: , Rfl:     sodium chloride (OCEAN) 0.65 % nasal spray, USE ONE SPRAY IN EACH NOSTRIL TWICE DAILY, Disp: , Rfl:     PMHx/PSHx Updates:   Past Medical  "History:   Diagnosis Date    Colon cancer     Essential (primary) hypertension     Heart disease     History of coronary artery disease status post placement of for coronary artery stents in the early ..    Mixed hyperlipidemia      Past Surgical History:   Procedure Laterality Date    CARDIAC DEFIBRILLATOR PLACEMENT N/A     3 defibrillators placed in the past    COLON RESECTION      HAND SURGERY Right        Family History:  Family History   Problem Relation Name Age of Onset    Heart failure Mother      Cancer Father      Cancer Brother         Social History:  Social History     Socioeconomic History    Marital status:    Tobacco Use    Smoking status: Former     Current packs/day: 0.00     Types: Cigarettes     Quit date:      Years since quittin.3    Smokeless tobacco: Never   Substance and Sexual Activity    Alcohol use: Yes     Alcohol/week: 6.0 standard drinks of alcohol     Types: 6 Cans of beer per week    Drug use: Never           Objective:     Vitals:  Blood pressure 102/69, pulse 61, temperature 97.4 °F (36.3 °C), temperature source Oral, resp. rate 16, height 5' 9.02" (1.753 m), weight 81.3 kg (179 lb 3.2 oz), SpO2 97%.    Physical Examination:   GEN: no apparent distress, comfortable; AAOx3  HEAD: atraumatic and normocephalic  EYES: no pallor, no icterus, PERRLA  ENT: OMM, no pharyngeal erythema, external ears WNL; no nasal discharge; no thrush  NECK: no masses, thyroid normal, trachea midline, no LAD/LN's, supple  CV: RRR with no murmur; normal pulse; normal S1 and S2; no pedal edema  CHEST: Normal respiratory effort; CTAB; normal breath sounds; no wheeze or crackles  ABDOM: nontender and nondistended; soft; normal bowel sounds; no rebound/guarding  MUSC/Skeletal: ROM normal; no crepitus; joints normal; no deformities or arthropathy  EXTREM: no clubbing, cyanosis, inflammation or swelling  SKIN: no rashes, lesions, ulcers, petechiae or subcutaneous nodules  : no jim  NEURO: " grossly intact; motor/sensory WNL; AAOx3; no tremors, Hard of hearing  PSYCH: normal mood, affect and behavior  LYMPH: normal cervical, supraclavicular, axillary and groin LN's    ECOG SCORE    0 - Fully active-able to carry on all pre-disease performance without restriction           Labs:   Hospital Outpatient Visit on 04/16/2025   Component Date Value Ref Range Status    POC Creatinine 04/16/2025 1.6 (H)  0.5 - 1.4 mg/dL Final    Sample 04/16/2025 unknown   Final   Lab Visit on 04/16/2025   Component Date Value Ref Range Status    Carcinoembryonic Antigen 04/16/2025 2.57  0.00 - 3.00 ng/mL Final    Sodium 04/16/2025 142  136 - 145 mmol/L Final    Potassium 04/16/2025 5.0  3.5 - 5.1 mmol/L Final    Chloride 04/16/2025 107  98 - 107 mmol/L Final    CO2 04/16/2025 27  23 - 31 mmol/L Final    Glucose 04/16/2025 175 (H)  82 - 115 mg/dL Final    Blood Urea Nitrogen 04/16/2025 24.0  8.4 - 25.7 mg/dL Final    Creatinine 04/16/2025 1.61 (H)  0.72 - 1.25 mg/dL Final    Calcium 04/16/2025 10.0  8.8 - 10.0 mg/dL Final    Protein Total 04/16/2025 8.1 (H)  5.8 - 7.6 gm/dL Final    Albumin 04/16/2025 4.2  3.4 - 4.8 g/dL Final    Globulin 04/16/2025 3.9 (H)  2.4 - 3.5 gm/dL Final    Albumin/Globulin Ratio 04/16/2025 1.1  1.1 - 2.0 ratio Final    Bilirubin Total 04/16/2025 0.7  <=1.5 mg/dL Final    ALP 04/16/2025 81  40 - 150 unit/L Final    ALT 04/16/2025 24  0 - 55 unit/L Final    AST 04/16/2025 30  11 - 45 unit/L Final    eGFR 04/16/2025 46  mL/min/1.73/m2 Final    Estimated GFR calculated using the CKD-EPI creatinine (2021) equation.    Anion Gap 04/16/2025 8.0  mEq/L Final    BUN/Creatinine Ratio 04/16/2025 15   Final    WBC 04/16/2025 6.54  4.50 - 11.50 x10(3)/mcL Final    RBC 04/16/2025 5.19  4.70 - 6.10 x10(6)/mcL Final    Hgb 04/16/2025 15.2  14.0 - 18.0 g/dL Final    Hct 04/16/2025 46.5  42.0 - 52.0 % Final    MCV 04/16/2025 89.6  80.0 - 94.0 fL Final    MCH 04/16/2025 29.3  27.0 - 31.0 pg Final    MCHC 04/16/2025 32.7  (L)  33.0 - 36.0 g/dL Final    RDW 04/16/2025 14.5  11.5 - 17.0 % Final    Platelet 04/16/2025 142  130 - 400 x10(3)/mcL Final    MPV 04/16/2025 10.6 (H)  7.4 - 10.4 fL Final    Neut % 04/16/2025 51.1  % Final    Lymph % 04/16/2025 28.9  % Final    Mono % 04/16/2025 9.8  % Final    Eos % 04/16/2025 8.7  % Final    Basophil % 04/16/2025 0.9  % Final    Imm Grans % 04/16/2025 0.6  % Final    Neut # 04/16/2025 3.34  2.1 - 9.2 x10(3)/mcL Final    Lymph # 04/16/2025 1.89  0.6 - 4.6 x10(3)/mcL Final    Mono # 04/16/2025 0.64  0.1 - 1.3 x10(3)/mcL Final    Eos # 04/16/2025 0.57  0 - 0.9 x10(3)/mcL Final    Baso # 04/16/2025 0.06  <=0.2 x10(3)/mcL Final    Imm Gran # 04/16/2025 0.04  0.00 - 0.04 x10(3)/mcL Final    NRBC% 04/16/2025 0.0  % Final     12/14/20 wbc 6.1,  hgb 14.6,  plt 162,  CEA 1.94  3/16/23 wbc 4.7, hgb 12.9, plt 131, CEA 2.96    Radiology/Diagnostic Studies:  - CT CAP 12/14/20: 1 cm pleural-based nodule anterior lateral right upper lobe. A few suspect mildly prominent lymph nodes at the right hilum and mediastinum measuring just over 1 cm. No prior comparison available. A few enlarged lymph nodes in the periportal pericaval region measuring up to 2 x 1 cm. Borderline hepatosplenomegaly.   - PET 12/22/20: right upper lobe subpleural nodule demonstrates no significant FDG activity. Prominent periportal lymph nodes demonstrate only mild FDG activity. No convincing PET evidence of metastatic disease.   - CT CAP 6/22/21: 1 cm pleural-based nodule again evident at the anterior lateral right upper lung. A few mildly prominent lymph nodes at the right hilum and mediastinum again evident a few enlarged lymph nodes again identified in the periportal and pericaval region.  CT A/P 2/23/22  1. A few mildly prominent lymph nodes again seen within the  pericaval/periportal region 2. Lumbar spondylosis and mild osteopenia 3. Small right fatty inguinal hernia 4. Atherosclerosis 5. Postsurgical changes again evident at the  right 2 1  CT Chest 2/23/22 1. 1 cm pleural-based nodule again evident at the anterior lateral  right upper lobe which has a similar appearance to the prior exam 2. Thoracic spondylosis 3. Atherosclerosis  CT CAP 3/16/23  1. 1 cm pleural based nodule again evident at the anterolateral right upper lobe with small punctate calcification  2. A few enlarged lymph nodes again identified within the periportal pericaval region which have a similar appearance to the prior exam  3. Postsurgical changes right colon  4. Thoracolumbar spondylosis  5. Atherosclerosis  CT CAP 9/21/23:  1. Pleural base nodule again evident at the anterolateral right upper lobe; not significantly changed  2. A few mildly prominent lymph nodes again identified within the pericaval/periportal region  3. Postsurgical changes right colon  4. Thoracolumbar spondylosis  CT CAP 4/1/24:  1. Stable pleural base nodule again evident at the anterior right upper lobe  2. Stable mildly prominent lymph nodes again identified at the pericaval and periportal region  3. Mild hepatomegaly with steatosis  4. Borderline splenomegaly  5. Atherosclerosis  6. Postsurgical changes right lower quadrant  7. Unchanged right fatty inguinal hernia  CT CAP 10/7/24:  1. Stable pleural base nodules again evident at the anterolateral right upper lobe  2. Stable mildly prominent lymph nodes again identified in the pericaval and periportal region  3. Stable prominent lymph nodes again identified within the pericaval/periportal region.  4. Borderline splenomegaly  CT CAP 4/16/25:  1. Stable pleural base nodule again evident at the anterolateral right upper lobe  2. A few prominent lymph nodes again identified at the pericaval/periportal region; not changed  3. Hepatomegaly with suspect steatosis  4. Borderline splenomegaly  5. Findings and other details as above    I have reviewed all available lab results and radiology reports.    Assessment/Plan:     1. Cancer of cecum    2.  Localized enlarged lymph nodes    3. Hepatomegaly      1.Stage I Colon Cancer of cecum - No adjuvant chemotherapy indicated. CT shows stable pulmonary nodule & nonspecific nodes. Will monitor closely. CEA normal.   He is aware children need to start colonoscopy at 39yo.   Discussed genetics counseling - they do not know specific types of cancer.     2. Pulmonary nodule- stable on CT, will cont to monitor.     3. LAD - stable    Generally routine scans are not typical for stage I disease, but will consider repeating  scans to eval for stability of findings    PLAN:  Patient with no clinical evidence of disease at this time. All findings stable.  We will continue surveillance in the meantime.    - Scans and labs adequate  - Clear from oncology stand point to have tympanoplasty w/ mastoidectomy on 5/5/25 with / Dylan  - Repeat CT CAP in 6 months, due 10/2025. Ordered today. If scans stable at this time, can switch to CT CAP yearly  - RTC 6 months with MD for FU/labs/scan  - CBC,CMP, CEA- same day as CT @ [facility], [place]    I spent a total of 40 minutes on the day of the visit.This includes face to face time and non-face to face time preparing to see the patient (eg, review of tests), obtaining and/or reviewing separately obtained history, documenting clinical information in the electronic or other health record, independently interpreting results and communicating results to the patient/family/caregiver, or care coordinator.  Visit today included increased complexity associated with the care of the episodic problem cecum cancer, addressing and managing the longitudinal care of the patient's Stage I Cecum Carcinoma.     Ashley Sylvester MD  Hematology/Oncology  [facility]    Professional Services   I, Judi Gutierrez LPN, acted solely as a scribe for and in the presence of Dr. Ashley Sylvester, who performed these services.

## 2025-04-15 ENCOUNTER — OFFICE VISIT (OUTPATIENT)
Dept: OTOLARYNGOLOGY | Facility: CLINIC | Age: 70
End: 2025-04-15
Payer: MEDICARE

## 2025-04-15 VITALS — HEIGHT: 69 IN | WEIGHT: 175.06 LBS | BODY MASS INDEX: 25.93 KG/M2

## 2025-04-15 DIAGNOSIS — B36.9 OTOMYCOSIS OF RIGHT EAR: ICD-10-CM

## 2025-04-15 DIAGNOSIS — H70.11 CHRONIC MASTOIDITIS OF RIGHT SIDE: ICD-10-CM

## 2025-04-15 DIAGNOSIS — H62.41 OTOMYCOSIS OF RIGHT EAR: ICD-10-CM

## 2025-04-15 DIAGNOSIS — H66.11 CHRONIC TUBOTYMPANIC SUPPURATIVE OTITIS MEDIA OF RIGHT EAR: Primary | ICD-10-CM

## 2025-04-15 DIAGNOSIS — H90.A31 MIXED CONDUCTIVE AND SENSORINEURAL HEARING LOSS OF RIGHT EAR WITH RESTRICTED HEARING OF LEFT EAR: ICD-10-CM

## 2025-04-15 DIAGNOSIS — H90.A22 SENSORINEURAL HEARING LOSS (SNHL) OF LEFT EAR WITH RESTRICTED HEARING OF RIGHT EAR: ICD-10-CM

## 2025-04-15 PROCEDURE — 99215 OFFICE O/P EST HI 40 MIN: CPT | Mod: PBBFAC | Performed by: OTOLARYNGOLOGY

## 2025-04-15 PROCEDURE — 87070 CULTURE OTHR SPECIMN AEROBIC: CPT | Performed by: OTOLARYNGOLOGY

## 2025-04-15 RX ORDER — CLOTRIMAZOLE 1 G/ML
SOLUTION TOPICAL
Qty: 10 ML | Refills: 0 | Status: SHIPPED | OUTPATIENT
Start: 2025-04-15

## 2025-04-15 RX ORDER — CLINDAMYCIN PHOSPHATE 900 MG/50ML
900 INJECTION, SOLUTION INTRAVENOUS
OUTPATIENT
Start: 2025-04-15

## 2025-04-15 NOTE — PROGRESS NOTES
[facility] Otolaryngology   Clinic Note    Eleazar Mark  Encounter Date: 4/15/2025   YOB: 1955  Physician: Lalo Richardson    CC: hearing loss, otorrhea     HPI: Eleazar Mark is a 69 y.o. male who presents with chronic otorrhea. About 10 years ago, his doctor noted incidental R-sided TM perforation. Then about 2 years ago, patient experienced progressive hearing loss. Recent audiogram not available for review. He did receive hearing aids but felt like they did not help much. In October 2024, patient presented to Dr. Coulter with R-sided otorrhea. He was placed on otic and PO antibiotics and referred to our clinic for further management. His infection did not clear after completing medications, and he's had intermittent purulent otorrhea for the past few months. CT temporal bone on 10/8 confirmed R-sided middle ear/mastoid effusion with apparent erosion of incus and stapes. Patient denies otalgia or dizziness. No issues with the L ear. No history of recurrent ear infections as a child. No history of ear tubes or other H&N surgery. Unrelated, he complains of clear drainage from his nose that has not resolved with multiple nasal sprays (although unsure which ones he's tried).     2/18/25: Patient presents for follow up. Audiogram today shows bilateral severe to profound hearing loss. Had trouble getting ciprodex drops approved by insurance so has only used for 5 days. Still with purulence drainage on the R side.     3/13/25: Patient presents for follow up. Otorrhea resolved about 2 days after his last visit. Completed 10-day course of Ciprodex drops and following dry ear precautions.     4/15/2025:   Patient presents today for follow-up for his right ear.  Since his last appointment the patient has redeveloped intermittent drainage from his right ear without any associated pain, dizziness, fever, or headaches.  Review of his repeat CT scan of the temporal bones done on 3/21/2025, shows persistent  opacification of the epitympanum and aditus ad antrum and the upper middle ear space with thickening of the tympanic membrane.  The incus and malleus are intact but who is difficult to determine the location of the stapes.  Results were discussed with the patient and his wife.      ROS: Negative except as stated in HPI.      Review of patient's allergies indicates:   Allergen Reactions    Cipro [ciprofloxacin hcl]     Penicillins        Past Medical History:   Diagnosis Date    Colon cancer     Essential (primary) hypertension     Heart disease     History of coronary artery disease status post placement of for coronary artery stents in the early ..    Mixed hyperlipidemia        Past Surgical History:   Procedure Laterality Date    CARDIAC DEFIBRILLATOR PLACEMENT N/A     3 defibrillators placed in the past    COLON RESECTION      HAND SURGERY Right        Social History     Socioeconomic History    Marital status:    Tobacco Use    Smoking status: Former     Current packs/day: 0.00     Types: Cigarettes     Quit date:      Years since quittin.3    Smokeless tobacco: Never   Substance and Sexual Activity    Alcohol use: Yes     Alcohol/week: 6.0 standard drinks of alcohol     Types: 6 Cans of beer per week    Drug use: Never       Family History   Problem Relation Name Age of Onset    Heart failure Mother      Cancer Father      Cancer Brother         Outpatient Encounter Medications as of 4/15/2025   Medication Sig Dispense Refill    aspirin 81 MG Chew Take 81 mg by mouth once daily.      atorvastatin (LIPITOR) 80 MG tablet Take 80 mg by mouth once daily.      cetirizine (ZYRTEC) 10 MG tablet Take 10 mg by mouth once daily.      clindamycin (CLEOCIN T) 1 % lotion apply to underams & right lower back TWICE DAILY until clear      cyanocobalamin (VITAMIN B-12) 1000 MCG tablet take one tablet by mouth EVERY DAY 30 tablet 3    dapagliflozin (FARXIGA) 5 mg Tab tablet Take 5 mg by mouth once daily.       dupilumab (DUPIXENT PEN) 200 mg/1.14 mL PnIj Inject 200 mg into the skin every 14 (fourteen) days.      ENTRESTO  mg per tablet Take 1 tablet by mouth 2 (two) times daily.      folic acid (FOLVITE) 1 MG tablet Take 1 tablet (1,000 mcg total) by mouth once daily. 30 tablet 3    hydrOXYzine HCL (ATARAX) 25 MG tablet Take 1 tablet by mouth once daily.      ipratropium (ATROVENT) 21 mcg (0.03 %) nasal spray 2 sprays by Each Nostril route 2 (two) times daily. 30 mL 0    JARDIANCE 10 mg tablet TAKE 1 TABLET DAILY FOR DIABETES      metoprolol succinate (TOPROL-XL) 200 MG 24 hr tablet Take 200 mg by mouth once daily.      montelukast (SINGULAIR) 10 mg tablet Take 10 mg by mouth daily as needed.      pantoprazole (PROTONIX) 40 MG tablet Take 40 mg by mouth once daily.      sodium chloride (OCEAN) 0.65 % nasal spray USE ONE SPRAY IN EACH NOSTRIL TWICE DAILY      clotrimazole (LOTRIMIN) 1 % Soln Four drops in the affected ear b.i.d. for 14 days. 10 mL 0    fluticasone propionate (FLOVENT DISKUS) 50 mcg/actuation DsDv Inhale into the lungs. Controller (Patient not taking: Reported on 4/15/2025)      isosorbide mononitrate (IMDUR) 30 MG 24 hr tablet Take 30 mg by mouth once daily. (Patient not taking: Reported on 4/15/2025)      metFORMIN (GLUCOPHAGE) 1000 MG tablet Take 1,000 mg by mouth once daily. (Patient not taking: Reported on 4/15/2025)      [DISCONTINUED] neomycin-polymyxin-dexamethasone (DEXACINE) 3.5 mg/g-10,000 unit/g-0.1 % Oint APPLY THIN LAYER TO LOWER EYELID ON BOTH EYES EVERY EVENING AT BEDTIME FOR TEN DAYS (Patient not taking: Reported on 4/15/2025)       No facility-administered encounter medications on file as of 4/15/2025.     PHYSICAL EXAM:  There were no vitals filed for this visit.  General Appearance: well nourished, well-developed, alert, oriented, in no acute distress  Head/Face: NC, AT  Eyes: PEERLA, EOMI, normal conjunctiva  Ears: Does not hear well at normal conversation volume  AD: external  normal, EAC without purulent otorrhea, TM with approximately 70% perforation that appears matured, the remaining TM is thickened and erythematous.  There is mucopurulent drainage present in the middle ear space draining through the tympanic membrane.  There was also accumulation of fungal elements on the external auditory canal and the surface of some of this material.  This was all evacuated and drainage was obtained for culture and sensitivity.  Mucoid middle ear secretions could be suctioned from the posterior superior aspect of the middle ear space.  No apparent cholesteatoma debris.  AS: external normal, ear canal normal, TM retracted but able to auto insufflate, no middle ear fluid  Nose: septum midline, no inferior turbinate hypertrophy, no polyps  OC/OP: dentition moderate, no oral lesions, FOM and BOT soft  Neck: soft, non-tender, no palpable lymph nodes, thyroid- no nodules or goiter  Neuro: CN II - XII grossly intact  Psychiatric: oriented to time, place and person, no depression, anxiety or agitation      PERTINENT DATA:  Audiogram (2/18/25):    Pure Tone Testing:      Right ear:       Severe to profound mixed hearing loss from 250-8kHz. Speech could not be tested at equipment limitations.        Left ear:          Moderately severe to profound sensorineural hearing loss from 250-8kHz. Speech reception threshold is in agreement with puretone findings.  Discrimination score of 44% is considered poor.                                                                Tympanometry:                                           Right ear:       Type 'B' tymp, normal (1.96mL) volume     Left ear:          Type 'Ad' tymp, hypercompliant TM mobility (5.40mL compliance)    Radiology:      ASSESSMENT:  69-year-old male with right-sided chronic otomastoiditis with tympanic membrane perforation and mixed hearing loss.  The patient improves only temporarily when treated with antibiotic eardrops.  Prior culture from the  right ear (2/18/2025) grew out Enterococcus faecalis.  Currently the patient also has otomycosis in the right ear.    Plan:   Cotrimazole 1% topical solution 4 drops in the right ear b.i.d..    Treatment options for the right ear were discussed with the patient in his wife.  This includes continued medical therapy with the use of antibiotic eardrops as needed with continued water precautions for the ear versus consideration of tympanoplasty with mastoidectomy.  I discussed with him that the primary purpose of the surgical procedure would be to eliminate the chronic infection and that hearing is a secondary concern.  Risks of the procedure including bleeding, infection, additional hearing loss, dizziness, facial nerve injury with facial weakness, chorda tympani nerve injury with altered taste, in spinal fluid leak were discussed.  Patient believes he would like to proceed with surgery and this is scheduled for 5/5/2025.  Patient will need cardiac clearance preoperatively and will contact his cardiologist.  Line consent was obtained in clinic today.      Lalo Richardson M.D.

## 2025-04-15 NOTE — H&P (VIEW-ONLY)
\[facility]\"" Otolaryngology   Clinic Note    Eleazar Mark  Encounter Date: 4/15/2025   YOB: 1955  Physician: Lalo Richardson    CC: hearing loss, otorrhea     HPI: Eleazar Mark is a 69 y.o. male who presents with chronic otorrhea. About 10 years ago, his doctor noted incidental R-sided TM perforation. Then about 2 years ago, patient experienced progressive hearing loss. Recent audiogram not available for review. He did receive hearing aids but felt like they did not help much. In October 2024, patient presented to Dr. Coulter with R-sided otorrhea. He was placed on otic and PO antibiotics and referred to our clinic for further management. His infection did not clear after completing medications, and he's had intermittent purulent otorrhea for the past few months. CT temporal bone on 10/8 confirmed R-sided middle ear/mastoid effusion with apparent erosion of incus and stapes. Patient denies otalgia or dizziness. No issues with the L ear. No history of recurrent ear infections as a child. No history of ear tubes or other H&N surgery. Unrelated, he complains of clear drainage from his nose that has not resolved with multiple nasal sprays (although unsure which ones he's tried).     2/18/25: Patient presents for follow up. Audiogram today shows bilateral severe to profound hearing loss. Had trouble getting ciprodex drops approved by insurance so has only used for 5 days. Still with purulence drainage on the R side.     3/13/25: Patient presents for follow up. Otorrhea resolved about 2 days after his last visit. Completed 10-day course of Ciprodex drops and following dry ear precautions.     4/15/2025:   Patient presents today for follow-up for his right ear.  Since his last appointment the patient has redeveloped intermittent drainage from his right ear without any associated pain, dizziness, fever, or headaches.  Review of his repeat CT scan of the temporal bones done on 3/21/2025, shows persistent  opacification of the epitympanum and aditus ad antrum and the upper middle ear space with thickening of the tympanic membrane.  The incus and malleus are intact but who is difficult to determine the location of the stapes.  Results were discussed with the patient and his wife.      ROS: Negative except as stated in HPI.      Review of patient's allergies indicates:   Allergen Reactions    Cipro [ciprofloxacin hcl]     Penicillins        Past Medical History:   Diagnosis Date    Colon cancer     Essential (primary) hypertension     Heart disease     History of coronary artery disease status post placement of for coronary artery stents in the early ..    Mixed hyperlipidemia        Past Surgical History:   Procedure Laterality Date    CARDIAC DEFIBRILLATOR PLACEMENT N/A     3 defibrillators placed in the past    COLON RESECTION      HAND SURGERY Right        Social History     Socioeconomic History    Marital status:    Tobacco Use    Smoking status: Former     Current packs/day: 0.00     Types: Cigarettes     Quit date:      Years since quittin.3    Smokeless tobacco: Never   Substance and Sexual Activity    Alcohol use: Yes     Alcohol/week: 6.0 standard drinks of alcohol     Types: 6 Cans of beer per week    Drug use: Never       Family History   Problem Relation Name Age of Onset    Heart failure Mother      Cancer Father      Cancer Brother         Outpatient Encounter Medications as of 4/15/2025   Medication Sig Dispense Refill    aspirin 81 MG Chew Take 81 mg by mouth once daily.      atorvastatin (LIPITOR) 80 MG tablet Take 80 mg by mouth once daily.      cetirizine (ZYRTEC) 10 MG tablet Take 10 mg by mouth once daily.      clindamycin (CLEOCIN T) 1 % lotion apply to underams & right lower back TWICE DAILY until clear      cyanocobalamin (VITAMIN B-12) 1000 MCG tablet take one tablet by mouth EVERY DAY 30 tablet 3    dapagliflozin (FARXIGA) 5 mg Tab tablet Take 5 mg by mouth once daily.       dupilumab (DUPIXENT PEN) 200 mg/1.14 mL PnIj Inject 200 mg into the skin every 14 (fourteen) days.      ENTRESTO  mg per tablet Take 1 tablet by mouth 2 (two) times daily.      folic acid (FOLVITE) 1 MG tablet Take 1 tablet (1,000 mcg total) by mouth once daily. 30 tablet 3    hydrOXYzine HCL (ATARAX) 25 MG tablet Take 1 tablet by mouth once daily.      ipratropium (ATROVENT) 21 mcg (0.03 %) nasal spray 2 sprays by Each Nostril route 2 (two) times daily. 30 mL 0    JARDIANCE 10 mg tablet TAKE 1 TABLET DAILY FOR DIABETES      metoprolol succinate (TOPROL-XL) 200 MG 24 hr tablet Take 200 mg by mouth once daily.      montelukast (SINGULAIR) 10 mg tablet Take 10 mg by mouth daily as needed.      pantoprazole (PROTONIX) 40 MG tablet Take 40 mg by mouth once daily.      sodium chloride (OCEAN) 0.65 % nasal spray USE ONE SPRAY IN EACH NOSTRIL TWICE DAILY      clotrimazole (LOTRIMIN) 1 % Soln Four drops in the affected ear b.i.d. for 14 days. 10 mL 0    fluticasone propionate (FLOVENT DISKUS) 50 mcg/actuation DsDv Inhale into the lungs. Controller (Patient not taking: Reported on 4/15/2025)      isosorbide mononitrate (IMDUR) 30 MG 24 hr tablet Take 30 mg by mouth once daily. (Patient not taking: Reported on 4/15/2025)      metFORMIN (GLUCOPHAGE) 1000 MG tablet Take 1,000 mg by mouth once daily. (Patient not taking: Reported on 4/15/2025)      [DISCONTINUED] neomycin-polymyxin-dexamethasone (DEXACINE) 3.5 mg/g-10,000 unit/g-0.1 % Oint APPLY THIN LAYER TO LOWER EYELID ON BOTH EYES EVERY EVENING AT BEDTIME FOR TEN DAYS (Patient not taking: Reported on 4/15/2025)       No facility-administered encounter medications on file as of 4/15/2025.     PHYSICAL EXAM:  There were no vitals filed for this visit.  General Appearance: well nourished, well-developed, alert, oriented, in no acute distress  Head/Face: NC, AT  Eyes: PEERLA, EOMI, normal conjunctiva  Ears: Does not hear well at normal conversation volume  AD: external  normal, EAC without purulent otorrhea, TM with approximately 70% perforation that appears matured, the remaining TM is thickened and erythematous.  There is mucopurulent drainage present in the middle ear space draining through the tympanic membrane.  There was also accumulation of fungal elements on the external auditory canal and the surface of some of this material.  This was all evacuated and drainage was obtained for culture and sensitivity.  Mucoid middle ear secretions could be suctioned from the posterior superior aspect of the middle ear space.  No apparent cholesteatoma debris.  AS: external normal, ear canal normal, TM retracted but able to auto insufflate, no middle ear fluid  Nose: septum midline, no inferior turbinate hypertrophy, no polyps  OC/OP: dentition moderate, no oral lesions, FOM and BOT soft  Neck: soft, non-tender, no palpable lymph nodes, thyroid- no nodules or goiter  Neuro: CN II - XII grossly intact  Psychiatric: oriented to time, place and person, no depression, anxiety or agitation      PERTINENT DATA:  Audiogram (2/18/25):    Pure Tone Testing:      Right ear:       Severe to profound mixed hearing loss from 250-8kHz. Speech could not be tested at equipment limitations.        Left ear:          Moderately severe to profound sensorineural hearing loss from 250-8kHz. Speech reception threshold is in agreement with puretone findings.  Discrimination score of 44% is considered poor.                                                                Tympanometry:                                           Right ear:       Type 'B' tymp, normal (1.96mL) volume     Left ear:          Type 'Ad' tymp, hypercompliant TM mobility (5.40mL compliance)    Radiology:      ASSESSMENT:  69-year-old male with right-sided chronic otomastoiditis with tympanic membrane perforation and mixed hearing loss.  The patient improves only temporarily when treated with antibiotic eardrops.  Prior culture from the  right ear (2/18/2025) grew out Enterococcus faecalis.  Currently the patient also has otomycosis in the right ear.    Plan:   Cotrimazole 1% topical solution 4 drops in the right ear b.i.d..    Treatment options for the right ear were discussed with the patient in his wife.  This includes continued medical therapy with the use of antibiotic eardrops as needed with continued water precautions for the ear versus consideration of tympanoplasty with mastoidectomy.  I discussed with him that the primary purpose of the surgical procedure would be to eliminate the chronic infection and that hearing is a secondary concern.  Risks of the procedure including bleeding, infection, additional hearing loss, dizziness, facial nerve injury with facial weakness, chorda tympani nerve injury with altered taste, in spinal fluid leak were discussed.  Patient believes he would like to proceed with surgery and this is scheduled for 5/5/2025.  Patient will need cardiac clearance preoperatively and will contact his cardiologist.  Line consent was obtained in clinic today.      Lalo Richardson M.D.

## 2025-04-15 NOTE — LETTER
This communication is flagged as high priority.                                               PRE-OPERATIVE CARDIAC RISK ASSESSMENT                                                              REQUEST FORM    Patient Name: Eleazar Mark             YOB: 1955             Date of Procedure: 5/5/25    Surgery: Mastoidectomy     Surgeon:            Dylan                                  Diagnosis: chronic ear infections, hearing loss    Type of Anesthesia:  [x] General      []Local/Regional     []MAC     []Conscious Sedation    Requesting staff name:  [facility]   Phone number:   [phone]                     Fax:  [phone]      CHECK ALL THAT APPLY AND COMPLETE THE BLANKS:    [x]Request for cardiac risk assessment for procedure    [x]Request to hold    ASA                   for      5        days prior to procedure    []Pre-procedure antibiotics: Cardiac Status Information    [x]Defibrillator recommendations for intra-op    [x]Additional records requested:  most recent progress notes, diagnostic tests and studies      Please fax this to [phone] or [phone]  ATTN: [facility] ([facility])

## 2025-04-16 ENCOUNTER — HOSPITAL ENCOUNTER (OUTPATIENT)
Dept: RADIOLOGY | Facility: HOSPITAL | Age: 70
Discharge: HOME | End: 2025-04-16
Payer: MEDICARE

## 2025-04-16 DIAGNOSIS — R59.0 LOCALIZED ENLARGED LYMPH NODES: ICD-10-CM

## 2025-04-16 DIAGNOSIS — C18.0 CANCER OF CECUM: ICD-10-CM

## 2025-04-16 LAB
CREAT SERPL-MCNC: 1.6 MG/DL (ref 0.5–1.4)
SAMPLE: ABNORMAL

## 2025-04-16 PROCEDURE — 71260 CT THORAX DX C+: CPT | Mod: TC

## 2025-04-16 PROCEDURE — 25500020 PHARM REV CODE 255

## 2025-04-16 RX ORDER — DIATRIZOATE MEGLUMINE AND DIATRIZOATE SODIUM 660; 100 MG/ML; MG/ML
30 SOLUTION ORAL; RECTAL
Status: COMPLETED | OUTPATIENT
Start: 2025-04-16 | End: 2025-04-16

## 2025-04-16 RX ORDER — IOPAMIDOL 755 MG/ML
100 INJECTION, SOLUTION INTRAVASCULAR
Status: COMPLETED | OUTPATIENT
Start: 2025-04-16 | End: 2025-04-16

## 2025-04-16 RX ADMIN — DIATRIZOATE MEGLUMINE AND DIATRIZOATE SODIUM 30 ML: 660; 100 LIQUID ORAL; RECTAL at 10:04

## 2025-04-16 RX ADMIN — IOPAMIDOL 100 ML: 755 INJECTION, SOLUTION INTRAVENOUS at 10:04

## 2025-04-17 LAB — BACTERIA DEEP WND CULT: NORMAL

## 2025-04-22 ENCOUNTER — OFFICE VISIT (OUTPATIENT)
Facility: CLINIC | Age: 70
End: 2025-04-22
Payer: MEDICARE

## 2025-04-22 VITALS
TEMPERATURE: 97 F | WEIGHT: 179.19 LBS | SYSTOLIC BLOOD PRESSURE: 102 MMHG | DIASTOLIC BLOOD PRESSURE: 69 MMHG | HEART RATE: 61 BPM | RESPIRATION RATE: 16 BRPM | HEIGHT: 69 IN | OXYGEN SATURATION: 97 % | BODY MASS INDEX: 26.54 KG/M2

## 2025-04-22 DIAGNOSIS — R59.0 LOCALIZED ENLARGED LYMPH NODES: ICD-10-CM

## 2025-04-22 DIAGNOSIS — C18.0 CANCER OF CECUM: Primary | ICD-10-CM

## 2025-04-22 DIAGNOSIS — R16.0 HEPATOMEGALY: ICD-10-CM

## 2025-04-22 PROCEDURE — 99999 PR PBB SHADOW E&M-EST. PATIENT-LVL V: CPT | Mod: PBBFAC,,, | Performed by: INTERNAL MEDICINE

## 2025-04-22 PROCEDURE — 99215 OFFICE O/P EST HI 40 MIN: CPT | Mod: PBBFAC | Performed by: INTERNAL MEDICINE

## 2025-04-22 PROCEDURE — G2211 COMPLEX E/M VISIT ADD ON: HCPCS | Mod: S$PBB,,, | Performed by: INTERNAL MEDICINE

## 2025-04-22 PROCEDURE — 99214 OFFICE O/P EST MOD 30 MIN: CPT | Mod: S$PBB,,, | Performed by: INTERNAL MEDICINE

## 2025-04-22 NOTE — OR NURSING
PACE CLINIC NOTE    Procedure date: 5/5/2025    Procedure: Tympanoplasty with Mastoidectomy    Request for Cardiac progress notes, diagnostics and blank CIED form faxed to:   [facility]  Fax [phone]     [phone]      4/22/2025 @ 1978: Progress notes and diagnostics received. Scanned into media manager. Completed CIED form was not received. [facility] complete, sign and date the CIED form.     CIED re-faxed to [phone]

## 2025-05-02 ENCOUNTER — ANESTHESIA EVENT (OUTPATIENT)
Dept: SURGERY | Facility: HOSPITAL | Age: 70
End: 2025-05-02
Payer: MEDICARE

## 2025-05-02 ENCOUNTER — PATIENT MESSAGE (OUTPATIENT)
Dept: SURGERY | Facility: HOSPITAL | Age: 70
End: 2025-05-02
Payer: MEDICARE

## 2025-05-02 NOTE — ANESTHESIA PREPROCEDURE EVALUATION
05/02/2025  Eleazar Mark is a 69 y.o., male.TYMPANOPLASTY, WITH MASTOIDECTOMY (Right       Vitals:    05/05/25 0509 05/05/25 0523 05/05/25 0612   BP: 108/69  105/67   Pulse: 60  82   Resp:   20   Temp: 35.8 °C (96.5 °F)  36.3 °C (97.3 °F)   TempSrc: Axillary  Temporal   SpO2: 99%  100%   Weight:  80.9 kg (178 lb 6.4 oz)          PMH of HTN (LD BB@  0330  ),  history of Cecal CA, HLD, history of AICD placement (CIED from noted 4.25.25) , CAD s/p PCI, CHF (Entresto)                Active Ambulatory Problems     Diagnosis Date Noted    Cancer of cecum 03/21/2023    Hepatomegaly 04/22/2025     Resolved Ambulatory Problems     Diagnosis Date Noted    No Resolved Ambulatory Problems     Past Medical History:   Diagnosis Date    Colon cancer     Essential (primary) hypertension     Heart disease     Mixed hyperlipidemia        Past Surgical History:   Procedure Laterality Date    CARDIAC DEFIBRILLATOR PLACEMENT N/A     3 defibrillators placed in the past    COLON RESECTION      HAND SURGERY Right          Lab Results   Component Value Date    WBC 6.54 04/16/2025    HGB 15.2 04/16/2025    HCT 46.5 04/16/2025     04/16/2025    ALT 24 04/16/2025    AST 30 04/16/2025     04/16/2025    K 5.0 04/16/2025     04/16/2025    CREATININE 1.61 (H) 04/16/2025    BUN 24.0 04/16/2025    CO2 27 04/16/2025       Medications Ordered Prior to Encounter[1]      Pre-op Assessment    I have reviewed the Patient Summary Reports.     I have reviewed the Nursing Notes. I have reviewed the NPO Status.   I have reviewed the Medications.     Review of Systems  Anesthesia Hx:  No problems with previous Anesthesia   History of prior surgery of interest to airway management or planning:          Denies Family Hx of Anesthesia complications.    Denies Personal Hx of Anesthesia complications.                     Hematology/Oncology:  Hematology Normal   Oncology Normal                                   EENT/Dental:  EENT/Dental Normal           Cardiovascular:  Cardiovascular Normal                                              Pulmonary:  Pulmonary Normal                       Renal/:  Renal/ Normal                 Hepatic/GI:  Hepatic/GI Normal                    Musculoskeletal:  Musculoskeletal Normal                Neurological:  Neurology Normal                                      Endocrine:  Endocrine Normal            Dermatological:  Skin Normal    Psych:  Psychiatric Normal                  Physical Exam  General: Well nourished, Cooperative, Alert and Oriented    Airway:  Mallampati: I / I  Mouth Opening: Normal  TM Distance: 4 - 6 cm  Tongue: Normal  Neck ROM: Normal ROM    Dental:  Intact      Anesthesia Plan  Type of Anesthesia, risks & benefits discussed:    Anesthesia Type: Gen ETT  Intra-op Monitoring Plan: Standard ASA Monitors  Post Op Pain Control Plan: multimodal analgesia and IV/PO Opioids PRN  Induction:  IV  Airway Plan: Direct  Informed Consent: Informed consent signed with the Patient and all parties understand the risks and agree with anesthesia plan.  All questions answered. Patient consented to blood products? No  ASA Score: 4  Day of Surgery Review of History & Physical: H&P Update referred to the surgeon/provider.    Ready For Surgery From Anesthesia Perspective.     .             [1]  No current facility-administered medications on file prior to encounter.     Current Outpatient Medications on File Prior to Encounter   Medication Sig Dispense Refill    aspirin 81 MG Chew Take 81 mg by mouth once daily.      atorvastatin (LIPITOR) 80 MG tablet Take 80 mg by mouth once daily.      ENTRESTO  mg per tablet Take 1 tablet by mouth 2 (two) times daily.      folic acid (FOLVITE) 1 MG tablet Take 1 tablet (1,000 mcg total) by mouth once daily. 30 tablet 3    ipratropium (ATROVENT) 21  mcg (0.03 %) nasal spray 2 sprays by Each Nostril route 2 (two) times daily. 30 mL 0    isosorbide mononitrate (IMDUR) 30 MG 24 hr tablet Take 30 mg by mouth once daily.      JARDIANCE 10 mg tablet TAKE 1 TABLET DAILY FOR DIABETES      metoprolol succinate (TOPROL-XL) 200 MG 24 hr tablet Take 200 mg by mouth once daily.      montelukast (SINGULAIR) 10 mg tablet Take 10 mg by mouth daily as needed.      pantoprazole (PROTONIX) 40 MG tablet Take 40 mg by mouth once daily.      cetirizine (ZYRTEC) 10 MG tablet Take 10 mg by mouth once daily.      clindamycin (CLEOCIN T) 1 % lotion apply to underams & right lower back TWICE DAILY until clear      clotrimazole (LOTRIMIN) 1 % Soln Four drops in the affected ear b.i.d. for 14 days. 10 mL 0    cyanocobalamin (VITAMIN B-12) 1000 MCG tablet take one tablet by mouth EVERY DAY 30 tablet 3    dapagliflozin (FARXIGA) 5 mg Tab tablet Take 5 mg by mouth once daily.      dupilumab (DUPIXENT PEN) 200 mg/1.14 mL PnIj Inject 200 mg into the skin every 14 (fourteen) days.      fluticasone propionate (FLOVENT DISKUS) 50 mcg/actuation DsDv Inhale into the lungs. Controller      hydrOXYzine HCL (ATARAX) 25 MG tablet Take 1 tablet by mouth once daily.      sodium chloride (OCEAN) 0.65 % nasal spray USE ONE SPRAY IN EACH NOSTRIL TWICE DAILY

## 2025-05-05 ENCOUNTER — ANESTHESIA (OUTPATIENT)
Dept: SURGERY | Facility: HOSPITAL | Age: 70
End: 2025-05-05
Payer: MEDICARE

## 2025-05-05 ENCOUNTER — HOSPITAL ENCOUNTER (OUTPATIENT)
Facility: HOSPITAL | Age: 70
Discharge: HOME | End: 2025-05-05
Attending: OTOLARYNGOLOGY | Admitting: OTOLARYNGOLOGY
Payer: MEDICARE

## 2025-05-05 DIAGNOSIS — H90.A31 MIXED CONDUCTIVE AND SENSORINEURAL HEARING LOSS OF RIGHT EAR WITH RESTRICTED HEARING OF LEFT EAR: ICD-10-CM

## 2025-05-05 DIAGNOSIS — H70.11 CHRONIC MASTOIDITIS OF RIGHT SIDE: ICD-10-CM

## 2025-05-05 DIAGNOSIS — H72.91 PERFORATION OF RIGHT TYMPANIC MEMBRANE: Primary | ICD-10-CM

## 2025-05-05 DIAGNOSIS — H66.11 CHRONIC TUBOTYMPANIC SUPPURATIVE OTITIS MEDIA OF RIGHT EAR: ICD-10-CM

## 2025-05-05 DIAGNOSIS — H90.A22 SENSORINEURAL HEARING LOSS (SNHL) OF LEFT EAR WITH RESTRICTED HEARING OF RIGHT EAR: ICD-10-CM

## 2025-05-05 DIAGNOSIS — Z01.818 PRE-OP EVALUATION: ICD-10-CM

## 2025-05-05 LAB
BASOPHILS # BLD AUTO: 0.07 X10(3)/MCL
BASOPHILS NFR BLD AUTO: 1.1 %
EOSINOPHIL # BLD AUTO: 0.52 X10(3)/MCL (ref 0–0.9)
EOSINOPHIL NFR BLD AUTO: 8.3 %
ERYTHROCYTE [DISTWIDTH] IN BLOOD BY AUTOMATED COUNT: 13.8 % (ref 11.5–17)
HCT VFR BLD AUTO: 43.8 % (ref 42–52)
HGB BLD-MCNC: 14.3 G/DL (ref 14–18)
IMM GRANULOCYTES # BLD AUTO: 0.04 X10(3)/MCL (ref 0–0.04)
IMM GRANULOCYTES NFR BLD AUTO: 0.6 %
LYMPHOCYTES # BLD AUTO: 1.68 X10(3)/MCL (ref 0.6–4.6)
LYMPHOCYTES NFR BLD AUTO: 26.9 %
MCH RBC QN AUTO: 29.7 PG (ref 27–31)
MCHC RBC AUTO-ENTMCNC: 32.6 G/DL (ref 33–36)
MCV RBC AUTO: 91.1 FL (ref 80–94)
MONOCYTES # BLD AUTO: 0.69 X10(3)/MCL (ref 0.1–1.3)
MONOCYTES NFR BLD AUTO: 11 %
NEUTROPHILS # BLD AUTO: 3.25 X10(3)/MCL (ref 2.1–9.2)
NEUTROPHILS NFR BLD AUTO: 52.1 %
NRBC BLD AUTO-RTO: 0 %
OHS QRS DURATION: 102 MS
OHS QTC CALCULATION: 429 MS
PLATELET # BLD AUTO: 117 X10(3)/MCL (ref 130–400)
PMV BLD AUTO: 11.7 FL (ref 7.4–10.4)
RBC # BLD AUTO: 4.81 X10(6)/MCL (ref 4.7–6.1)
WBC # BLD AUTO: 6.25 X10(3)/MCL (ref 4.5–11.5)

## 2025-05-05 PROCEDURE — 37000008 HC ANESTHESIA 1ST 15 MINUTES: Performed by: OTOLARYNGOLOGY

## 2025-05-05 PROCEDURE — 27201423 OPTIME MED/SURG SUP & DEVICES STERILE SUPPLY: Performed by: OTOLARYNGOLOGY

## 2025-05-05 PROCEDURE — 25000003 PHARM REV CODE 250: Performed by: OTOLARYNGOLOGY

## 2025-05-05 PROCEDURE — 71000016 HC POSTOP RECOV ADDL HR: Performed by: OTOLARYNGOLOGY

## 2025-05-05 PROCEDURE — 36000709 HC OR TIME LEV III EA ADD 15 MIN: Performed by: OTOLARYNGOLOGY

## 2025-05-05 PROCEDURE — 85025 COMPLETE CBC W/AUTO DIFF WBC: CPT | Performed by: OTOLARYNGOLOGY

## 2025-05-05 PROCEDURE — 71000015 HC POSTOP RECOV 1ST HR: Performed by: OTOLARYNGOLOGY

## 2025-05-05 PROCEDURE — 63600175 PHARM REV CODE 636 W HCPCS: Performed by: OTOLARYNGOLOGY

## 2025-05-05 PROCEDURE — 37000009 HC ANESTHESIA EA ADD 15 MINS: Performed by: OTOLARYNGOLOGY

## 2025-05-05 PROCEDURE — 25000003 PHARM REV CODE 250: Performed by: SPECIALIST

## 2025-05-05 PROCEDURE — 63600175 PHARM REV CODE 636 W HCPCS: Performed by: SPECIALIST

## 2025-05-05 PROCEDURE — D9220A PRA ANESTHESIA: Mod: CRNA,,,

## 2025-05-05 PROCEDURE — 25000003 PHARM REV CODE 250

## 2025-05-05 PROCEDURE — 63600175 PHARM REV CODE 636 W HCPCS

## 2025-05-05 PROCEDURE — 71000033 HC RECOVERY, INTIAL HOUR: Performed by: OTOLARYNGOLOGY

## 2025-05-05 PROCEDURE — 36000708 HC OR TIME LEV III 1ST 15 MIN: Performed by: OTOLARYNGOLOGY

## 2025-05-05 PROCEDURE — D9220A PRA ANESTHESIA: Mod: ANES,,, | Performed by: SPECIALIST

## 2025-05-05 PROCEDURE — 93005 ELECTROCARDIOGRAM TRACING: CPT

## 2025-05-05 PROCEDURE — P9045 ALBUMIN (HUMAN), 5%, 250 ML: HCPCS | Mod: JZ,TB

## 2025-05-05 PROCEDURE — C1729 CATH, DRAINAGE: HCPCS | Performed by: OTOLARYNGOLOGY

## 2025-05-05 RX ORDER — PROPOFOL 10 MG/ML
VIAL (ML) INTRAVENOUS
Status: DISCONTINUED | OUTPATIENT
Start: 2025-05-05 | End: 2025-05-05

## 2025-05-05 RX ORDER — ROCURONIUM BROMIDE 10 MG/ML
INJECTION, SOLUTION INTRAVENOUS
Status: DISCONTINUED | OUTPATIENT
Start: 2025-05-05 | End: 2025-05-05

## 2025-05-05 RX ORDER — EPINEPHRINE 1 MG/ML
INJECTION INTRAMUSCULAR; INTRAVENOUS; SUBCUTANEOUS
Status: DISCONTINUED | OUTPATIENT
Start: 2025-05-05 | End: 2025-05-05 | Stop reason: HOSPADM

## 2025-05-05 RX ORDER — OXYCODONE AND ACETAMINOPHEN 5; 325 MG/1; MG/1
2 TABLET ORAL ONCE
Status: COMPLETED | OUTPATIENT
Start: 2025-05-05 | End: 2025-05-05

## 2025-05-05 RX ORDER — SODIUM CHLORIDE, SODIUM LACTATE, POTASSIUM CHLORIDE, CALCIUM CHLORIDE 600; 310; 30; 20 MG/100ML; MG/100ML; MG/100ML; MG/100ML
INJECTION, SOLUTION INTRAVENOUS CONTINUOUS
Status: DISCONTINUED | OUTPATIENT
Start: 2025-05-05 | End: 2025-05-05 | Stop reason: HOSPADM

## 2025-05-05 RX ORDER — CALCIUM CHLORIDE DIHYDRATE 100 MG/ML
INJECTION, SOLUTION INTRAVENOUS
Status: DISCONTINUED | OUTPATIENT
Start: 2025-05-05 | End: 2025-05-05

## 2025-05-05 RX ORDER — HYDROMORPHONE HYDROCHLORIDE 1 MG/ML
0.5 INJECTION, SOLUTION INTRAMUSCULAR; INTRAVENOUS; SUBCUTANEOUS EVERY 5 MIN PRN
Status: DISCONTINUED | OUTPATIENT
Start: 2025-05-05 | End: 2025-05-05 | Stop reason: HOSPADM

## 2025-05-05 RX ORDER — PHENYLEPHRINE HYDROCHLORIDE 10 MG/ML
INJECTION INTRAVENOUS
Status: DISCONTINUED | OUTPATIENT
Start: 2025-05-05 | End: 2025-05-05

## 2025-05-05 RX ORDER — GLYCOPYRROLATE 0.2 MG/ML
INJECTION INTRAMUSCULAR; INTRAVENOUS
Status: DISCONTINUED | OUTPATIENT
Start: 2025-05-05 | End: 2025-05-05

## 2025-05-05 RX ORDER — ONDANSETRON HYDROCHLORIDE 2 MG/ML
INJECTION, SOLUTION INTRAMUSCULAR; INTRAVENOUS
Status: DISCONTINUED | OUTPATIENT
Start: 2025-05-05 | End: 2025-05-05

## 2025-05-05 RX ORDER — LIDOCAINE HYDROCHLORIDE 20 MG/ML
INJECTION, SOLUTION EPIDURAL; INFILTRATION; INTRACAUDAL; PERINEURAL
Status: DISCONTINUED | OUTPATIENT
Start: 2025-05-05 | End: 2025-05-05

## 2025-05-05 RX ORDER — GLUCAGON 1 MG
1 KIT INJECTION
Status: DISCONTINUED | OUTPATIENT
Start: 2025-05-05 | End: 2025-05-05 | Stop reason: HOSPADM

## 2025-05-05 RX ORDER — DOXYCYCLINE 100 MG/1
100 CAPSULE ORAL 2 TIMES DAILY
Qty: 14 CAPSULE | Refills: 0 | Status: SHIPPED | OUTPATIENT
Start: 2025-05-05 | End: 2025-05-12

## 2025-05-05 RX ORDER — OFLOXACIN 3 MG/ML
SOLUTION/ DROPS OPHTHALMIC
Status: DISCONTINUED | OUTPATIENT
Start: 2025-05-05 | End: 2025-05-05 | Stop reason: HOSPADM

## 2025-05-05 RX ORDER — ETOMIDATE 2 MG/ML
INJECTION INTRAVENOUS
Status: DISCONTINUED | OUTPATIENT
Start: 2025-05-05 | End: 2025-05-05

## 2025-05-05 RX ORDER — OFLOXACIN 3 MG/ML
5 SOLUTION AURICULAR (OTIC) 2 TIMES DAILY
Qty: 10 ML | Refills: 0 | Status: SHIPPED | OUTPATIENT
Start: 2025-05-05 | End: 2025-05-13 | Stop reason: SDUPTHER

## 2025-05-05 RX ORDER — EPHEDRINE SULFATE 50 MG/ML
INJECTION, SOLUTION INTRAVENOUS
Status: DISCONTINUED | OUTPATIENT
Start: 2025-05-05 | End: 2025-05-05

## 2025-05-05 RX ORDER — HYDROMORPHONE HYDROCHLORIDE 1 MG/ML
0.2 INJECTION, SOLUTION INTRAMUSCULAR; INTRAVENOUS; SUBCUTANEOUS EVERY 5 MIN PRN
Status: DISCONTINUED | OUTPATIENT
Start: 2025-05-05 | End: 2025-05-05 | Stop reason: HOSPADM

## 2025-05-05 RX ORDER — ONDANSETRON HYDROCHLORIDE 2 MG/ML
4 INJECTION, SOLUTION INTRAVENOUS ONCE
Status: DISCONTINUED | OUTPATIENT
Start: 2025-05-05 | End: 2025-05-05

## 2025-05-05 RX ORDER — OXYCODONE HYDROCHLORIDE 5 MG/1
5 TABLET ORAL EVERY 4 HOURS PRN
Qty: 18 TABLET | Refills: 0 | Status: SHIPPED | OUTPATIENT
Start: 2025-05-05

## 2025-05-05 RX ORDER — VASOPRESSIN 20 [USP'U]/ML
INJECTION, SOLUTION INTRAMUSCULAR; SUBCUTANEOUS
Status: DISCONTINUED | OUTPATIENT
Start: 2025-05-05 | End: 2025-05-05

## 2025-05-05 RX ORDER — KETAMINE HCL IN 0.9 % NACL 50 MG/5 ML
SYRINGE (ML) INTRAVENOUS
Status: DISCONTINUED | OUTPATIENT
Start: 2025-05-05 | End: 2025-05-05

## 2025-05-05 RX ORDER — ALBUMIN HUMAN 50 G/1000ML
SOLUTION INTRAVENOUS CONTINUOUS PRN
Status: DISCONTINUED | OUTPATIENT
Start: 2025-05-05 | End: 2025-05-05

## 2025-05-05 RX ORDER — MEPERIDINE HYDROCHLORIDE 25 MG/ML
12.5 INJECTION INTRAMUSCULAR; INTRAVENOUS; SUBCUTANEOUS ONCE
Status: DISCONTINUED | OUTPATIENT
Start: 2025-05-05 | End: 2025-05-05 | Stop reason: HOSPADM

## 2025-05-05 RX ORDER — IPRATROPIUM BROMIDE AND ALBUTEROL SULFATE 2.5; .5 MG/3ML; MG/3ML
3 SOLUTION RESPIRATORY (INHALATION) ONCE AS NEEDED
Status: DISCONTINUED | OUTPATIENT
Start: 2025-05-05 | End: 2025-05-05 | Stop reason: HOSPADM

## 2025-05-05 RX ORDER — DEXAMETHASONE SODIUM PHOSPHATE 4 MG/ML
INJECTION, SOLUTION INTRA-ARTICULAR; INTRALESIONAL; INTRAMUSCULAR; INTRAVENOUS; SOFT TISSUE
Status: DISCONTINUED | OUTPATIENT
Start: 2025-05-05 | End: 2025-05-05

## 2025-05-05 RX ORDER — MIDAZOLAM HYDROCHLORIDE 2 MG/2ML
2 INJECTION, SOLUTION INTRAMUSCULAR; INTRAVENOUS ONCE
Status: COMPLETED | OUTPATIENT
Start: 2025-05-05 | End: 2025-05-05

## 2025-05-05 RX ORDER — MUPIROCIN 20 MG/G
OINTMENT TOPICAL
Status: DISCONTINUED | OUTPATIENT
Start: 2025-05-05 | End: 2025-05-05 | Stop reason: HOSPADM

## 2025-05-05 RX ORDER — DIPHENHYDRAMINE HYDROCHLORIDE 50 MG/ML
25 INJECTION, SOLUTION INTRAMUSCULAR; INTRAVENOUS ONCE AS NEEDED
Status: DISCONTINUED | OUTPATIENT
Start: 2025-05-05 | End: 2025-05-05 | Stop reason: HOSPADM

## 2025-05-05 RX ORDER — PROCHLORPERAZINE EDISYLATE 5 MG/ML
5 INJECTION INTRAMUSCULAR; INTRAVENOUS ONCE AS NEEDED
Status: DISCONTINUED | OUTPATIENT
Start: 2025-05-05 | End: 2025-05-05 | Stop reason: HOSPADM

## 2025-05-05 RX ORDER — FENTANYL CITRATE 50 UG/ML
INJECTION, SOLUTION INTRAMUSCULAR; INTRAVENOUS
Status: DISCONTINUED | OUTPATIENT
Start: 2025-05-05 | End: 2025-05-05

## 2025-05-05 RX ORDER — ONDANSETRON 4 MG/1
4 TABLET, ORALLY DISINTEGRATING ORAL ONCE
Status: COMPLETED | OUTPATIENT
Start: 2025-05-05 | End: 2025-05-05

## 2025-05-05 RX ORDER — CLINDAMYCIN PHOSPHATE 900 MG/50ML
900 INJECTION, SOLUTION INTRAVENOUS
Status: COMPLETED | OUTPATIENT
Start: 2025-05-05 | End: 2025-05-05

## 2025-05-05 RX ADMIN — PHENYLEPHRINE HYDROCHLORIDE 50 MCG: 10 INJECTION INTRAVENOUS at 10:05

## 2025-05-05 RX ADMIN — PROPOFOL 50 MG: 10 INJECTION, EMULSION INTRAVENOUS at 07:05

## 2025-05-05 RX ADMIN — Medication 10 MG: at 10:05

## 2025-05-05 RX ADMIN — CLINDAMYCIN PHOSPHATE 900 MG: 900 INJECTION, SOLUTION INTRAVENOUS at 07:05

## 2025-05-05 RX ADMIN — VASOPRESSIN 2 UNITS: 20 INJECTION INTRAVENOUS at 10:05

## 2025-05-05 RX ADMIN — CALCIUM CHLORIDE 250 MG: 100 INJECTION, SOLUTION INTRAVENOUS at 09:05

## 2025-05-05 RX ADMIN — DEXAMETHASONE SODIUM PHOSPHATE 4 MG: 4 INJECTION, SOLUTION INTRA-ARTICULAR; INTRALESIONAL; INTRAMUSCULAR; INTRAVENOUS; SOFT TISSUE at 07:05

## 2025-05-05 RX ADMIN — SODIUM CHLORIDE, POTASSIUM CHLORIDE, SODIUM LACTATE AND CALCIUM CHLORIDE: 600; 310; 30; 20 INJECTION, SOLUTION INTRAVENOUS at 07:05

## 2025-05-05 RX ADMIN — ROCURONIUM BROMIDE 50 MG: 10 INJECTION INTRAVENOUS at 07:05

## 2025-05-05 RX ADMIN — VASOPRESSIN 1 UNITS: 20 INJECTION INTRAVENOUS at 10:05

## 2025-05-05 RX ADMIN — EPHEDRINE SULFATE 15 MG: 50 INJECTION INTRAVENOUS at 08:05

## 2025-05-05 RX ADMIN — FENTANYL CITRATE 50 MCG: 50 INJECTION, SOLUTION INTRAMUSCULAR; INTRAVENOUS at 10:05

## 2025-05-05 RX ADMIN — LIDOCAINE HYDROCHLORIDE 80 MG: 20 INJECTION, SOLUTION EPIDURAL; INFILTRATION; INTRACAUDAL; PERINEURAL at 07:05

## 2025-05-05 RX ADMIN — ALBUMIN (HUMAN) 500 ML/HR: 12.5 INJECTION, SOLUTION INTRAVENOUS at 10:05

## 2025-05-05 RX ADMIN — PHENYLEPHRINE HYDROCHLORIDE 50 MCG: 10 INJECTION INTRAVENOUS at 09:05

## 2025-05-05 RX ADMIN — PHENYLEPHRINE HYDROCHLORIDE 25 MCG: 10 INJECTION INTRAVENOUS at 08:05

## 2025-05-05 RX ADMIN — OXYCODONE HYDROCHLORIDE AND ACETAMINOPHEN 2 TABLET: 5; 325 TABLET ORAL at 01:05

## 2025-05-05 RX ADMIN — ETOMIDATE INJECTION 10 MG: 2 SOLUTION INTRAVENOUS at 07:05

## 2025-05-05 RX ADMIN — MIDAZOLAM HYDROCHLORIDE 2 MG: 1 INJECTION, SOLUTION INTRAMUSCULAR; INTRAVENOUS at 06:05

## 2025-05-05 RX ADMIN — FENTANYL CITRATE 50 MCG: 50 INJECTION, SOLUTION INTRAMUSCULAR; INTRAVENOUS at 07:05

## 2025-05-05 RX ADMIN — GLYCOPYRROLATE 0.2 MG: 0.2 INJECTION INTRAMUSCULAR; INTRAVENOUS at 08:05

## 2025-05-05 RX ADMIN — ONDANSETRON 4 MG: 2 INJECTION INTRAMUSCULAR; INTRAVENOUS at 11:05

## 2025-05-05 RX ADMIN — FENTANYL CITRATE 25 MCG: 50 INJECTION, SOLUTION INTRAMUSCULAR; INTRAVENOUS at 09:05

## 2025-05-05 RX ADMIN — VASOPRESSIN 1 UNITS: 20 INJECTION INTRAVENOUS at 09:05

## 2025-05-05 RX ADMIN — SODIUM CHLORIDE, POTASSIUM CHLORIDE, SODIUM LACTATE AND CALCIUM CHLORIDE: 600; 310; 30; 20 INJECTION, SOLUTION INTRAVENOUS at 06:05

## 2025-05-05 RX ADMIN — FENTANYL CITRATE 25 MCG: 50 INJECTION, SOLUTION INTRAMUSCULAR; INTRAVENOUS at 11:05

## 2025-05-05 RX ADMIN — PROPOFOL 30 MG: 10 INJECTION, EMULSION INTRAVENOUS at 07:05

## 2025-05-05 RX ADMIN — CALCIUM CHLORIDE 250 MG: 100 INJECTION, SOLUTION INTRAVENOUS at 10:05

## 2025-05-05 RX ADMIN — FENTANYL CITRATE 50 MCG: 50 INJECTION, SOLUTION INTRAMUSCULAR; INTRAVENOUS at 08:05

## 2025-05-05 RX ADMIN — EPHEDRINE SULFATE 10 MG: 50 INJECTION INTRAVENOUS at 07:05

## 2025-05-05 RX ADMIN — SODIUM CHLORIDE, POTASSIUM CHLORIDE, SODIUM LACTATE AND CALCIUM CHLORIDE: 600; 310; 30; 20 INJECTION, SOLUTION INTRAVENOUS at 09:05

## 2025-05-05 RX ADMIN — Medication 10 MG: at 09:05

## 2025-05-05 RX ADMIN — ETOMIDATE INJECTION 4 MG: 2 SOLUTION INTRAVENOUS at 07:05

## 2025-05-05 RX ADMIN — ONDANSETRON 4 MG: 4 TABLET, ORALLY DISINTEGRATING ORAL at 03:05

## 2025-05-05 RX ADMIN — PHENYLEPHRINE HYDROCHLORIDE 75 MCG: 10 INJECTION INTRAVENOUS at 09:05

## 2025-05-05 RX ADMIN — Medication 10 MG: at 12:05

## 2025-05-05 RX ADMIN — PHENYLEPHRINE HYDROCHLORIDE 30 MCG/MIN: 10 INJECTION INTRAVENOUS at 08:05

## 2025-05-05 RX ADMIN — VASOPRESSIN 1 UNITS: 20 INJECTION INTRAVENOUS at 11:05

## 2025-05-05 RX ADMIN — EPHEDRINE SULFATE 15 MG: 50 INJECTION INTRAVENOUS at 07:05

## 2025-05-05 RX ADMIN — EPHEDRINE SULFATE 25 MG: 50 INJECTION, SOLUTION INTRAVENOUS at 10:05

## 2025-05-05 NOTE — ANESTHESIA PROCEDURE NOTES
Intubation    Date/Time: 5/5/2025 7:21 AM    Performed by: Teresita Arredondo CRNA  Authorized by: Chloé Yan MD    Intubation:     Induction:  Intravenous    Intubated:  Postinduction    Mask Ventilation:  Easy with oral airway (100 mm OA)    Attempts:  1    Attempted By:  CRNA    Method of Intubation:  Direct    Blade:  Enamorado 2    Laryngeal View Grade: Grade I - full view of cords      Difficult Airway Encountered?: No      Complications:  None    Airway Device:  Oral endotracheal tube    Airway Device Size:  7.5    Style/Cuff Inflation:  Cuffed (inflated to minimal occlusive pressure)    Tube secured:  23    Secured at:  The lips    Placement Verified By:  Capnometry    Complicating Factors:  None    Findings Post-Intubation:  BS equal bilateral and atraumatic/condition of teeth unchanged

## 2025-05-05 NOTE — DISCHARGE INSTRUCTIONS
· Keep follow up appointment at the [facility] (ENT[facility].    ` Start drops tonight as directed 5 drops to right ear twice daily.    `Antibiotic as ordered - 2 x day x 7 days.    · You may take a shower/bathing starting tomorrow. Keep right ear dry. Use a cotton ball covered in Vaseline when showering.    · Hydration is important in your recovery.      · Try to avoid blowing nose vigorously and sneeze and cough with open mouth x 4 weeks    ` You may change your cotton ball in your ear as needed for small amount of oozing.  Bleeding is a primary post op concern with this surgery. Call clinic immediately if it is a large amount running down throat AND go to ER.    · Call clinic immediately with any nausea/vomiting or go to ER.    · You may alternate Ibuprofen and Tylenol every 4 hours as needed for pain or discomfort    · Do not drink alcohol or drive today.    · Notify MD of any moderate to severe pain unrelieved by pain medicine, if you have continuous or severe bleeding, or for any signs of infection including fever above 100.4, excessive redness or swelling, yellow/green foul- smelling drainage, nausea or vomiting. Clinics number is [phone]. If it is after business hours or emergency call [phone] and state Im having post op complications and need to speak to the ENT surgeon on call.    - Thanks for choosing [facility]! Have a smooth recovery!

## 2025-05-05 NOTE — BRIEF OP NOTE
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 [H66.11]     * Chronic mastoiditis of right side [H70.11]     * Mixed conductive and sensorineural hearing loss of right ear with restricted hearing of left ear [H90.A31]     * Sensorineural hearing loss (SNHL) of left ear with restricted hearing of right ear [H90.A22]    Post-op Diagnosis:  Post-Op Diagnosis Codes:     * 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 [H66.11]     * Chronic mastoiditis of right side [H70.11]     * Mixed conductive and sensorineural hearing loss of right ear with restricted hearing of left ear [H90.A31]     * Sensorineural hearing loss (SNHL) of left ear with restricted hearing of right ear [H90.A22]    Procedure(s) (LRB):  TYMPANOPLASTY, WITH MASTOIDECTOMY (Right)    Anesthesia: General    Operative Findings: see operative note for full details    Estimated Blood Loss: 10 cc         Specimens:   Specimen (24h ago, onward)      None                  Discharge Note    OUTCOME: Patient tolerated treatment/procedure well without complication and is now ready for discharge.    DISPOSITION: Home or Self Care    FINAL DIAGNOSIS:  Chronic otomastoiditis with TM perforation    FOLLOWUP: In clinic in 3 weeks    DISCHARGE INSTRUCTIONS:    Dry ear precautions  Sinus precautions (no nose blowing)  Start Floxin drops to right ear BID for 3 weeks

## 2025-05-05 NOTE — TRANSFER OF CARE
Anesthesia Transfer of Care Note    Patient: Eleazar Mark    Procedure(s) Performed: Procedure(s) (LRB):  TYMPANOPLASTY, WITH MASTOIDECTOMY (Right)    Patient location: PACU    Anesthesia Type: general    Transport from OR: Transported from OR on room air with adequate spontaneous ventilation    Post pain: adequate analgesia    Post assessment: no apparent anesthetic complications    Post vital signs: stable    Level of consciousness: sedated    Nausea/Vomiting: no nausea/vomiting    Complications: none    Transfer of care protocol was followed      Last vitals: Visit Vitals  /67   Pulse 82   Temp 36.3 °C (97.3 °F) (Temporal)   Resp 20   Wt 80.9 kg (178 lb 6.4 oz)   SpO2 100%   BMI 26.33 kg/m²

## 2025-05-07 VITALS
HEART RATE: 64 BPM | WEIGHT: 178.38 LBS | RESPIRATION RATE: 16 BRPM | BODY MASS INDEX: 26.33 KG/M2 | TEMPERATURE: 97 F | OXYGEN SATURATION: 95 % | DIASTOLIC BLOOD PRESSURE: 72 MMHG | SYSTOLIC BLOOD PRESSURE: 114 MMHG

## 2025-05-12 NOTE — ANESTHESIA POSTPROCEDURE EVALUATION
Anesthesia Post Evaluation    Patient: Eleazar Mark    Procedure(s) Performed: Procedure(s) (LRB):  TYMPANOPLASTY, WITH MASTOIDECTOMY (Right)    Final Anesthesia Type: general      Patient location during evaluation: PACU  Patient participation: Yes- Able to Participate  Level of consciousness: awake and responds to stimulation  Post-procedure vital signs: reviewed and stable  Pain management: adequate  Airway patency: patent    PONV status at discharge: No PONV  Anesthetic complications: no      Cardiovascular status: blood pressure returned to baseline  Respiratory status: unassisted  Hydration status: euvolemic  Follow-up not needed.          Vitals Value Taken Time   /72 05/05/25 15:31   Temp 36.2 °C (97.1 °F) 05/05/25 12:50   Pulse 63 05/05/25 15:35   Resp 16 05/05/25 13:01   SpO2 95 % 05/05/25 15:35   Vitals shown include unfiled device data.      Event Time   Out of Recovery 12:49:00         Pain/Westley Score: No data recorded

## 2025-05-13 ENCOUNTER — OFFICE VISIT (OUTPATIENT)
Dept: OTOLARYNGOLOGY | Facility: CLINIC | Age: 70
End: 2025-05-13
Payer: MEDICARE

## 2025-05-13 VITALS
BODY MASS INDEX: 26.36 KG/M2 | OXYGEN SATURATION: 99 % | SYSTOLIC BLOOD PRESSURE: 103 MMHG | WEIGHT: 178 LBS | HEART RATE: 62 BPM | HEIGHT: 69 IN | DIASTOLIC BLOOD PRESSURE: 69 MMHG | RESPIRATION RATE: 20 BRPM | TEMPERATURE: 98 F

## 2025-05-13 DIAGNOSIS — H72.91 TYMPANIC MEMBRANE PERFORATION, RIGHT: ICD-10-CM

## 2025-05-13 DIAGNOSIS — Z98.890 S/P TYMPANOPLASTY: ICD-10-CM

## 2025-05-13 DIAGNOSIS — H66.3X1 CHRONIC SUPPURATIVE OTITIS MEDIA OF RIGHT EAR, UNSPECIFIED OTITIS MEDIA LOCATION: ICD-10-CM

## 2025-05-13 DIAGNOSIS — H91.90 HEARING LOSS, UNSPECIFIED HEARING LOSS TYPE, UNSPECIFIED LATERALITY: Primary | ICD-10-CM

## 2025-05-13 PROBLEM — H70.11 CHRONIC MASTOIDITIS OF RIGHT SIDE: Status: ACTIVE | Noted: 2025-05-13

## 2025-05-13 PROBLEM — H66.11 CHRONIC TUBOTYMPANIC SUPPURATIVE OTITIS MEDIA OF RIGHT EAR: Status: ACTIVE | Noted: 2025-05-13

## 2025-05-13 PROBLEM — H90.A31 MIXED CONDUCTIVE AND SENSORINEURAL HEARING LOSS OF RIGHT EAR WITH RESTRICTED HEARING OF LEFT EAR: Status: ACTIVE | Noted: 2025-05-13

## 2025-05-13 PROCEDURE — 99214 OFFICE O/P EST MOD 30 MIN: CPT | Mod: PBBFAC

## 2025-05-13 RX ORDER — OFLOXACIN 3 MG/ML
5 SOLUTION AURICULAR (OTIC) 2 TIMES DAILY
Qty: 10 ML | Refills: 0 | Status: SHIPPED | OUTPATIENT
Start: 2025-05-13

## 2025-05-13 NOTE — OP NOTE
Name: Eleazar Mark    : 1955  MRN: [id number]    Date:  2025    Pre-op diagnosis:  Right tympanic membrane perforation  Chronic otomastoiditis    Post-op diagnosis:  Same    Procedure:  Right post-auricular tympanoplasty with cartilage and fascia graft  Right atticotomy    Anesthesia:  General    Surgeon:  Staff - Lalo Richardson MD  Resident - Prince Oquendo MD      Brief History    Findings  Right TM with large central perforation (40%)  Middle ear mucosa thickened with signs of chronic inflammation  Umbo and distal handle of malleus eroded, incus present and in continuity with head of malleus      Description of Procedure    After appropriate identification, the patient was induced with general endotracheal anesthesia.   EMG needle electrodes were placed for continuous facial nerve monitoring.    A total of 3 cc 1% Xylocaine with epinephrine were injected in the ear canal, tragus and postauricular region.  The usual technique of aspiration before injection was utilized.  There were no anesthetic hemodynamic changes after the injection.    The right ear was prepped and draped in the usual fashion for otologic surgery.   A right postauricular incision was designed to extend from the postauricular sulcus just into the scalp region.  The operating microscope was brought into the field and 1% lidocaine and [id number] epinephrine total of 3 ml were used to inject 4 quadrants of the ear canal as well as the tragus and postauricularly. The view in to the ear canal showed a patent ear canal with a tympanic membrane central perforation.    POSTAURICULAR  Next attention was turned to making a postauricular incision, approximately 1 fingerbreadth from the postauricular crease. The incision was taken down to subcutaneous tissue.  A Weitlaner retractor was placed as well as a Chetan retractor and  a fascia graft was harvested over the temporalis using 15 blade then Metzenbaum scissors and set aside on back table.   Next, an incision was made along the linea temporalis and then down to the mastoid tip in a number 7 shaped incision providing a Palva flap which was elevated from the mastoid in a subperiosteal plane toward the external auditory canal exposing the spine of Henle and the posterior canal wall. Clark blades were used to make a horizontal canal incision and then vertical incisions at 6 and 12 o'clock to raise the vascular strip.  Weitlaner retractors were placed to retract the vascular strip and obtain a good view of the tympanic membrane.    Next the tympanic membrane perforation edge was removed to freshen the edge using a Dias and microcup forceps.  A sickle knife was used to make incision at 6 and 12 o'clock laterally. A tympanomeatal flap was then elevated by making a horizontal incision between the 6 and 12 o'clock incision. A round knife followed by a dias needle and Gimmick were used to raise tympanomeatal flap from 6 to 12 o clock.  The chorda tympani was identified and preserved.  The ossicles were examined and noted that the malleus was mobile.    It was necessary to remove the scutum and we performed a scutectomy with a currette and extended to an inside-out mastoidectomy with opening of the attic and into the antrum. Once we identified pyramidal eminence as well as malleus anteriorly and the fallopian canal superiorly, we palpated the ossicular chain and again noted an osscicular chain intact.  Remaining middle ear anatomy appeared normal.     CARTILAGE  Tragal cartilage was harvested by making an incision on the posterior aspect of the tragus through skin down to the cartilage with a 15 blade.  The incision was carried through the tragal cartilage, then iris scissors used to elevate surrounding soft tissue off of the tragal perichondrium anteriorly and posteriorly, then tragal cartilage cut superiorly and inferiorly with scissors to release the cartilage.  On the back table the cartilage pieces were  trimmed to size and their edges beveled with a 15 blade.  The incision was closed with 6-0 plain gut interrupted suture.    The temporalis fascia graft was placed over the malleus and pulled anteriorly to cover beyond the area of the perforation with gelfoam placed medially to the grafts  .   Next a portion of the beveled tragal cartilage was placed anteriorlyposteriorly to the site of perfoation and the site of the atticotomy/scutectomy over the gelfoam, followed by redraping the temporalis fascia over the cartilage and tucking it under the anterior edge of the perforation. Gelfoam was placed in the posterior middle ear and then the temporalis fascia draped over, followed by laying the tympanomeatal flap back in place over these.  Additional gelfoam was placed overlying the tympanomeatal flap followed by mupirocin ointment.    There was no aberrant firing from the monitor during the procedure.  At the conclusion of all the dissection stimulation of the facial nerve resulted in full field response.    POSTAURICULAR  The Palva flap was then reapproximated with 2-0 Vicryl sutures and the wound was closed with 2-0 and 3-0 Vicryl interrupted sutures, as well as Mastisol and Steri-Strips for the skin.  We then placed a compressive mastoid dressing consisting of a Towns ear dressing.    Patient tolerated the procedure well without complication.    Dr. Richardson was present for the entirety of the procedure.    Prince Oquendo MD  [facility] PGY-V

## 2025-05-13 NOTE — PROGRESS NOTES
[facility] Otolaryngology   Clinic Note    Eleazar Mark  Encounter Date: 5/13/2025   YOB: 1955  Physician: Prince Oquendo    CC: hearing loss, otorrhea     HPI: Eleazar Mark is a 69 y.o. male who presents with chronic otorrhea. About 10 years ago, his doctor noted incidental R-sided TM perforation. Then about 2 years ago, patient experienced progressive hearing loss. Recent audiogram not available for review. He did receive hearing aids but felt like they did not help much. In October 2024, patient presented to Dr. Coulter with R-sided otorrhea. He was placed on otic and PO antibiotics and referred to our clinic for further management. His infection did not clear after completing medications, and he's had intermittent purulent otorrhea for the past few months. CT temporal bone on 10/8 confirmed R-sided middle ear/mastoid effusion with apparent erosion of incus and stapes. Patient denies otalgia or dizziness. No issues with the L ear. No history of recurrent ear infections as a child. No history of ear tubes or other H&N surgery. Unrelated, he complains of clear drainage from his nose that has not resolved with multiple nasal sprays (although unsure which ones he's tried).     2/18/25: Patient presents for follow up. Audiogram today shows bilateral severe to profound hearing loss. Had trouble getting ciprodex drops approved by insurance so has only used for 5 days. Still with purulence drainage on the R side.     3/13/25: Patient presents for follow up. Otorrhea resolved about 2 days after his last visit. Completed 10-day course of Ciprodex drops and following dry ear precautions.     4/15/2025:   Patient presents today for follow-up for his right ear.  Since his last appointment the patient has redeveloped intermittent drainage from his right ear without any associated pain, dizziness, fever, or headaches.  Review of his repeat CT scan of the temporal bones done on 3/21/2025, shows persistent opacification  of the epitympanum and aditus ad antrum and the upper middle ear space with thickening of the tympanic membrane.  The incus and malleus are intact but who is difficult to determine the location of the stapes.  Results were discussed with the patient and his wife.    25:  Here today for post-op visit s/p tympanoplasty with atticotomy on 25.  He is doing well from surgery.  Has been using ear drops twice daily.  Keeping ear dry.    ROS: Negative except as stated in HPI.      Review of patient's allergies indicates:   Allergen Reactions    Cipro [ciprofloxacin hcl]     Penicillins        Past Medical History:   Diagnosis Date    Colon cancer     Essential (primary) hypertension     Heart disease     History of coronary artery disease status post placement of for coronary artery stents in the early ..    Mixed hyperlipidemia        Past Surgical History:   Procedure Laterality Date    CARDIAC DEFIBRILLATOR PLACEMENT N/A     3 defibrillators placed in the past    COLON RESECTION      HAND SURGERY Right     TYMPANOPLASTY WITH MASTOIDECTOMY Right 2025    Procedure: TYMPANOPLASTY, WITH MASTOIDECTOMY;  Surgeon: Lalo Richardson MD;  Location: [facility];  Service: ENT;  Laterality: Right;  Patient has defibrillator       Social History     Socioeconomic History    Marital status:    Tobacco Use    Smoking status: Former     Current packs/day: 0.00     Types: Cigarettes     Quit date:      Years since quittin.3    Smokeless tobacco: Never   Substance and Sexual Activity    Alcohol use: Yes     Alcohol/week: 6.0 standard drinks of alcohol     Types: 6 Cans of beer per week    Drug use: Never       Family History   Problem Relation Name Age of Onset    Heart failure Mother      Cancer Father      Cancer Brother         Outpatient Encounter Medications as of 2025   Medication Sig Dispense Refill    aspirin 81 MG Chew Take 81 mg by mouth once daily.      atorvastatin (LIPITOR) 80 MG tablet  Take 80 mg by mouth once daily.      cetirizine (ZYRTEC) 10 MG tablet Take 10 mg by mouth once daily.      clindamycin (CLEOCIN T) 1 % lotion apply to underams & right lower back TWICE DAILY until clear      clotrimazole (LOTRIMIN) 1 % Soln Four drops in the affected ear b.i.d. for 14 days. 10 mL 0    cyanocobalamin (VITAMIN B-12) 1000 MCG tablet take one tablet by mouth EVERY DAY 30 tablet 3    dapagliflozin (FARXIGA) 5 mg Tab tablet Take 5 mg by mouth once daily.      dupilumab (DUPIXENT PEN) 200 mg/1.14 mL PnIj Inject 200 mg into the skin every 14 (fourteen) days.      ENTRESTO  mg per tablet Take 1 tablet by mouth 2 (two) times daily.      fluticasone propionate (FLOVENT DISKUS) 50 mcg/actuation DsDv Inhale into the lungs. Controller      folic acid (FOLVITE) 1 MG tablet Take 1 tablet (1,000 mcg total) by mouth once daily. 30 tablet 3    hydrOXYzine HCL (ATARAX) 25 MG tablet Take 1 tablet by mouth once daily.      ipratropium (ATROVENT) 21 mcg (0.03 %) nasal spray 2 sprays by Each Nostril route 2 (two) times daily. 30 mL 0    isosorbide mononitrate (IMDUR) 30 MG 24 hr tablet Take 30 mg by mouth once daily.      JARDIANCE 10 mg tablet TAKE 1 TABLET DAILY FOR DIABETES      metoprolol succinate (TOPROL-XL) 200 MG 24 hr tablet Take 200 mg by mouth once daily.      montelukast (SINGULAIR) 10 mg tablet Take 10 mg by mouth daily as needed.      oxyCODONE (ROXICODONE) 5 MG immediate release tablet Take 1 tablet (5 mg total) by mouth every 4 (four) hours as needed for Pain. 18 tablet 0    pantoprazole (PROTONIX) 40 MG tablet Take 40 mg by mouth once daily.      sodium chloride (OCEAN) 0.65 % nasal spray USE ONE SPRAY IN EACH NOSTRIL TWICE DAILY      [DISCONTINUED] ofloxacin (FLOXIN) 0.3 % otic solution Place 5 drops into the right ear 2 (two) times daily. 10 mL 0    [] doxycycline (MONODOX) 100 MG capsule Take 1 capsule (100 mg total) by mouth 2 (two) times daily. for 7 days 14 capsule 0    ofloxacin  (FLOXIN) 0.3 % otic solution Place 5 drops into the right ear 2 (two) times daily. 10 mL 0    [DISCONTINUED] metFORMIN (GLUCOPHAGE) 1000 MG tablet Take 1,000 mg by mouth once daily. (Patient not taking: Reported on 4/22/2025)       No facility-administered encounter medications on file as of 5/13/2025.     PHYSICAL EXAM:  Vitals:    05/13/25 0820   BP: 103/69   Pulse: 62   Resp: 20   Temp: 97.7 °F (36.5 °C)     General Appearance: well nourished, well-developed, alert, oriented, in no acute distress  Head/Face: NC, AT  Eyes: PEERLA, EOMI, normal conjunctiva  Ears: Does not hear well at normal conversation volume  AD: external normal, EAC without purulent otorrhea, TM with approximately 70% perforation that appears matured, the remaining TM is thickened and erythematous.  There is mucopurulent drainage present in the middle ear space draining through the tympanic membrane.  There was also accumulation of fungal elements on the external auditory canal and the surface of some of this material.  This was all evacuated and drainage was obtained for culture and sensitivity.  Mucoid middle ear secretions could be suctioned from the posterior superior aspect of the middle ear space.  No apparent cholesteatoma debris.  AS: external normal, ear canal normal, TM retracted but able to auto insufflate, no middle ear fluid  Nose: septum midline, no inferior turbinate hypertrophy, no polyps  OC/OP: dentition moderate, no oral lesions, FOM and BOT soft  Neck: soft, non-tender, no palpable lymph nodes, thyroid- no nodules or goiter  Neuro: CN II - XII grossly intact  Psychiatric: oriented to time, place and person, no depression, anxiety or agitation      PERTINENT DATA:  Audiogram (2/18/25):      Pure Tone Testing:      Right ear:       Severe to profound mixed hearing loss from 250-8kHz. Speech could not be tested at equipment limitations.        Left ear:          Moderately severe to profound sensorineural hearing loss from  250-8kHz. Speech reception threshold is in agreement with puretone findings.  Discrimination score of 44% is considered poor.                                                                Tympanometry:                                           Right ear:       Type 'B' tymp, normal (1.96mL) volume     Left ear:          Type 'Ad' tymp, hypercompliant TM mobility (5.40mL compliance)    Radiology:      ASSESSMENT:  69-year-old male with right-sided chronic otomastoiditis with tympanic membrane perforation and mixed hearing loss.  The patient improves only temporarily when treated with antibiotic eardrops.  Prior culture from the right ear (2/18/2025) grew out Enterococcus faecalis.  Now s/p tympanoplasty with fascia and cartilage graft with atticotomy on 5/5/25.    Plan:   - Continue dry ear precautions  - Floxin drops twice daily to right ear  - Continue sinus precautions - no nose blowing.  - RTC in 2 weeks.  At that time will suction any remaining packing from the ear canal and examine the TM and graft.  Will then schedule for 3 month audio.    Prince Oquendo MD  [employer] - PGY 5

## 2025-05-29 ENCOUNTER — OFFICE VISIT (OUTPATIENT)
Dept: OTOLARYNGOLOGY | Facility: CLINIC | Age: 70
End: 2025-05-29
Payer: MEDICARE

## 2025-05-29 VITALS — SYSTOLIC BLOOD PRESSURE: 101 MMHG | TEMPERATURE: 98 F | HEART RATE: 59 BPM | DIASTOLIC BLOOD PRESSURE: 67 MMHG

## 2025-05-29 DIAGNOSIS — H72.91 TYMPANIC MEMBRANE PERFORATION, RIGHT: Primary | ICD-10-CM

## 2025-05-29 DIAGNOSIS — H91.90 HEARING LOSS, UNSPECIFIED HEARING LOSS TYPE, UNSPECIFIED LATERALITY: ICD-10-CM

## 2025-05-29 DIAGNOSIS — Z98.890 S/P TYMPANOPLASTY: ICD-10-CM

## 2025-05-29 PROCEDURE — 99215 OFFICE O/P EST HI 40 MIN: CPT | Mod: PBBFAC | Performed by: STUDENT IN AN ORGANIZED HEALTH CARE EDUCATION/TRAINING PROGRAM

## 2025-05-29 RX ORDER — CALCIUM CITRATE/VITAMIN D3 200MG-6.25
1 TABLET ORAL
COMMUNITY
Start: 2025-05-13

## 2025-05-29 RX ORDER — NITROGLYCERIN 0.4 MG/1
0.4 TABLET SUBLINGUAL
COMMUNITY
Start: 2025-05-09

## 2025-05-29 RX ORDER — NYSTATIN AND TRIAMCINOLONE ACETONIDE 100000; 1 [USP'U]/G; MG/G
CREAM TOPICAL 4 TIMES DAILY
Qty: 30 G | Refills: 1 | Status: SHIPPED | OUTPATIENT
Start: 2025-05-29

## 2025-05-29 RX ORDER — TACROLIMUS 1 MG/G
OINTMENT TOPICAL 2 TIMES DAILY
COMMUNITY
Start: 2025-05-25

## 2025-05-29 RX ORDER — OFLOXACIN 3 MG/ML
5 SOLUTION AURICULAR (OTIC) 2 TIMES DAILY
Qty: 10 ML | Refills: 0 | Status: SHIPPED | OUTPATIENT
Start: 2025-05-29

## 2025-05-29 NOTE — PROGRESS NOTES
[facility] Otolaryngology   Clinic Note    Eleazar Mark  Encounter Date: 5/29/2025   YOB: 1955  Physician: Lakshmi Tobar    CC: hearing loss, otorrhea     HPI: Eleazar Mark is a 70 y.o. male who presents with chronic otorrhea. About 10 years ago, his doctor noted incidental R-sided TM perforation. Then about 2 years ago, patient experienced progressive hearing loss. Recent audiogram not available for review. He did receive hearing aids but felt like they did not help much. In October 2024, patient presented to Dr. Coulter with R-sided otorrhea. He was placed on otic and PO antibiotics and referred to our clinic for further management. His infection did not clear after completing medications, and he's had intermittent purulent otorrhea for the past few months. CT temporal bone on 10/8 confirmed R-sided middle ear/mastoid effusion with apparent erosion of incus and stapes. Patient denies otalgia or dizziness. No issues with the L ear. No history of recurrent ear infections as a child. No history of ear tubes or other H&N surgery. Unrelated, he complains of clear drainage from his nose that has not resolved with multiple nasal sprays (although unsure which ones he's tried).     2/18/25: Patient presents for follow up. Audiogram today shows bilateral severe to profound hearing loss. Had trouble getting ciprodex drops approved by insurance so has only used for 5 days. Still with purulence drainage on the R side.     3/13/25: Patient presents for follow up. Otorrhea resolved about 2 days after his last visit. Completed 10-day course of Ciprodex drops and following dry ear precautions.     4/15/2025:   Patient presents today for follow-up for his right ear.  Since his last appointment the patient has redeveloped intermittent drainage from his right ear without any associated pain, dizziness, fever, or headaches.  Review of his repeat CT scan of the temporal bones done on 3/21/2025, shows persistent  opacification of the epitympanum and aditus ad antrum and the upper middle ear space with thickening of the tympanic membrane.  The incus and malleus are intact but who is difficult to determine the location of the stapes.  Results were discussed with the patient and his wife.     surgery:  Findings  Right TM with large central perforation (40%)  Middle ear mucosa thickened with signs of chronic inflammation  Umbo and distal handle of malleus eroded, incus present and in continuity with head of malleus    25:  Here today for post-op visit s/p tympanoplasty with atticotomy on 25.  He is doing well from surgery.  Has been using ear drops twice daily.  Keeping ear dry.    2025:  Returns today for 2nd postop.  Patient reports that ear has been draining less he is still using the drops reports his hearing is on any better no other complaints    ROS: Negative except as stated in HPI.      Review of patient's allergies indicates:   Allergen Reactions    Cipro [ciprofloxacin hcl]     Penicillins        Past Medical History:   Diagnosis Date    Colon cancer     Essential (primary) hypertension     Heart disease     History of coronary artery disease status post placement of for coronary artery stents in the early ..    Mixed hyperlipidemia        Past Surgical History:   Procedure Laterality Date    CARDIAC DEFIBRILLATOR PLACEMENT N/A     3 defibrillators placed in the past    COLON RESECTION      HAND SURGERY Right     TYMPANOPLASTY WITH MASTOIDECTOMY Right 2025    Procedure: TYMPANOPLASTY, WITH MASTOIDECTOMY;  Surgeon: Lalo Richardson MD;  Location: [facility];  Service: ENT;  Laterality: Right;  Patient has defibrillator       Social History     Socioeconomic History    Marital status:    Tobacco Use    Smoking status: Former     Current packs/day: 0.00     Types: Cigarettes     Quit date:      Years since quittin.4    Smokeless tobacco: Never   Substance and Sexual Activity     Alcohol use: Yes     Alcohol/week: 6.0 standard drinks of alcohol     Types: 6 Cans of beer per week    Drug use: Never       Family History   Problem Relation Name Age of Onset    Heart failure Mother      Cancer Father      Cancer Brother         Outpatient Encounter Medications as of 5/29/2025   Medication Sig Dispense Refill    aspirin 81 MG Chew Take 81 mg by mouth once daily.      atorvastatin (LIPITOR) 80 MG tablet Take 80 mg by mouth once daily.      cetirizine (ZYRTEC) 10 MG tablet Take 10 mg by mouth once daily.      clindamycin (CLEOCIN T) 1 % lotion apply to underams & right lower back TWICE DAILY until clear      cyanocobalamin (VITAMIN B-12) 1000 MCG tablet take one tablet by mouth EVERY DAY 30 tablet 3    dapagliflozin (FARXIGA) 5 mg Tab tablet Take 5 mg by mouth once daily.      dupilumab (DUPIXENT PEN) 200 mg/1.14 mL PnIj Inject 200 mg into the skin every 14 (fourteen) days.      ENTRESTO  mg per tablet Take 1 tablet by mouth 2 (two) times daily.      folic acid (FOLVITE) 1 MG tablet Take 1 tablet (1,000 mcg total) by mouth once daily. 30 tablet 3    ipratropium (ATROVENT) 21 mcg (0.03 %) nasal spray 2 sprays by Each Nostril route 2 (two) times daily. 30 mL 0    isosorbide mononitrate (IMDUR) 30 MG 24 hr tablet Take 30 mg by mouth once daily.      JARDIANCE 10 mg tablet TAKE 1 TABLET DAILY FOR DIABETES      metoprolol succinate (TOPROL-XL) 200 MG 24 hr tablet Take 200 mg by mouth once daily.      montelukast (SINGULAIR) 10 mg tablet Take 10 mg by mouth daily as needed.      nitroGLYCERIN (NITROSTAT) 0.4 MG SL tablet Place 0.4 mg under the tongue as needed.      ofloxacin (FLOXIN) 0.3 % otic solution Place 5 drops into the right ear 2 (two) times daily. 10 mL 0    pantoprazole (PROTONIX) 40 MG tablet Take 40 mg by mouth once daily.      sodium chloride (OCEAN) 0.65 % nasal spray USE ONE SPRAY IN EACH NOSTRIL TWICE DAILY      tacrolimus (PROTOPIC) 0.1 % ointment Apply topically 2 (two) times  daily.      TRUE METRIX GLUCOSE TEST STRIP Strp 1 strip.      clotrimazole (LOTRIMIN) 1 % Soln Four drops in the affected ear b.i.d. for 14 days. (Patient not taking: Reported on 5/29/2025) 10 mL 0    fluticasone propionate (FLOVENT DISKUS) 50 mcg/actuation DsDv Inhale into the lungs. Controller (Patient not taking: Reported on 5/29/2025)      hydrOXYzine HCL (ATARAX) 25 MG tablet Take 1 tablet by mouth once daily. (Patient not taking: Reported on 5/29/2025)      oxyCODONE (ROXICODONE) 5 MG immediate release tablet Take 1 tablet (5 mg total) by mouth every 4 (four) hours as needed for Pain. (Patient not taking: Reported on 5/29/2025) 18 tablet 0     No facility-administered encounter medications on file as of 5/29/2025.     PHYSICAL EXAM:  Vitals:    05/29/25 0743   BP: 101/67   Pulse: (!) 59   Temp: 97.8 °F (36.6 °C)     General Appearance: well nourished, well-developed, alert, oriented, in no acute distress  Head/Face: NC, AT  Eyes: PEERLA, EOMI, normal conjunctiva  Ears: Does not hear well at normal conversation volume  AD: external normal, EAC without purulent otorrhea, TM with approximately 70% perforation that appears matured, the remaining TM is thickened and erythematous.  There is mucopurulent drainage present in the middle ear space draining through the tympanic membrane.  There was also accumulation of fungal elements on the external auditory canal and the surface of some of this material.  This was all evacuated and drainage was obtained for culture and sensitivity.  Mucoid middle ear secretions could be suctioned from the posterior superior aspect of the middle ear space.  No apparent cholesteatoma debris.  AS: external normal, ear canal normal, TM retracted but able to auto insufflate, no middle ear fluid  Nose: septum midline, no inferior turbinate hypertrophy, no polyps  OC/OP: dentition moderate, no oral lesions, FOM and BOT soft  Neck: soft, non-tender, no palpable lymph nodes, thyroid- no nodules  or goiter  Neuro: CN II - XII grossly intact  Psychiatric: oriented to time, place and person, no depression, anxiety or agitation      PERTINENT DATA:  Audiogram (2/18/25):      Pure Tone Testing:      Right ear:       Severe to profound mixed hearing loss from 250-8kHz. Speech could not be tested at equipment limitations.        Left ear:          Moderately severe to profound sensorineural hearing loss from 250-8kHz. Speech reception threshold is in agreement with puretone findings.  Discrimination score of 44% is considered poor.                                                                Tympanometry:                                           Right ear:       Type 'B' tymp, normal (1.96mL) volume     Left ear:          Type 'Ad' tymp, hypercompliant TM mobility (5.40mL compliance)      Radiology:        ASSESSMENT:  69-year-old male with right-sided chronic otomastoiditis with tympanic membrane perforation and mixed hearing loss.  The patient improves only temporarily when treated with antibiotic eardrops.  Prior culture from the right ear (2/18/2025) grew out Enterococcus faecalis.  Now s/p tympanoplasty with fascia and cartilage graft with atticotomy on 5/5/25.    Graft healing well with possible minute perforation anteriorly.     Plan:   - Continue dry ear precautions  - Mycolog ointment to ear twice weekly; continue drops every other day to ear.  - Postop audio scheduled for approx 8/1 and RTC same day    Lakshmi Tobar MD  [employer] - PGY 4

## 2025-07-29 ENCOUNTER — CLINICAL SUPPORT (OUTPATIENT)
Dept: AUDIOLOGY | Facility: HOSPITAL | Age: 70
End: 2025-07-29
Payer: MEDICARE

## 2025-07-29 ENCOUNTER — OFFICE VISIT (OUTPATIENT)
Dept: OTOLARYNGOLOGY | Facility: CLINIC | Age: 70
End: 2025-07-29
Payer: MEDICARE

## 2025-07-29 VITALS
BODY MASS INDEX: 26.35 KG/M2 | DIASTOLIC BLOOD PRESSURE: 60 MMHG | HEIGHT: 69 IN | SYSTOLIC BLOOD PRESSURE: 93 MMHG | WEIGHT: 177.94 LBS | TEMPERATURE: 98 F | RESPIRATION RATE: 18 BRPM | HEART RATE: 60 BPM | OXYGEN SATURATION: 99 %

## 2025-07-29 DIAGNOSIS — Z98.890 S/P TYMPANOPLASTY: ICD-10-CM

## 2025-07-29 DIAGNOSIS — H90.A31 MIXED CONDUCTIVE AND SENSORINEURAL HEARING LOSS OF RIGHT EAR WITH RESTRICTED HEARING OF LEFT EAR: Primary | ICD-10-CM

## 2025-07-29 DIAGNOSIS — H91.90 HEARING LOSS, UNSPECIFIED HEARING LOSS TYPE, UNSPECIFIED LATERALITY: ICD-10-CM

## 2025-07-29 DIAGNOSIS — H70.11 CHRONIC MASTOIDITIS OF RIGHT SIDE: ICD-10-CM

## 2025-07-29 DIAGNOSIS — H72.91 TYMPANIC MEMBRANE PERFORATION, RIGHT: Primary | ICD-10-CM

## 2025-07-29 DIAGNOSIS — R09.81 NASAL CONGESTION: ICD-10-CM

## 2025-07-29 DIAGNOSIS — H72.91 TYMPANIC MEMBRANE PERFORATION, RIGHT: ICD-10-CM

## 2025-07-29 PROCEDURE — 92557 COMPREHENSIVE HEARING TEST: CPT | Performed by: AUDIOLOGIST

## 2025-07-29 PROCEDURE — 99215 OFFICE O/P EST HI 40 MIN: CPT | Mod: PBBFAC,25 | Performed by: OTOLARYNGOLOGY

## 2025-07-29 PROCEDURE — 92567 TYMPANOMETRY: CPT | Performed by: AUDIOLOGIST

## 2025-07-29 RX ORDER — FLUTICASONE PROPIONATE 50 MCG
1 SPRAY, SUSPENSION (ML) NASAL DAILY
Qty: 15.8 ML | Refills: 0 | Status: SHIPPED | OUTPATIENT
Start: 2025-07-29 | End: 2025-08-28

## 2025-07-29 NOTE — PROGRESS NOTES
Hearing Evaluation        Patient History: Mr. Mark has a long-standing hearing loss reporting no changes in hearing since S/P right T-plasty. He also reports constant tinnitus, left ear only. Vertigo and middle ear issues are denied. All additional history is unremarkable.        Test Results:                    Pure Tone Testing:      Right ear:       Severe to profound mixed hearing loss from 250-8kHz. No responses for speech obtained at equipment limitations.     Left ear:          Moderately severe to profound sensorineural hearing loss from 250-8kHz. Speech reception threshold is in agreement with puretone findings.  Discrimination score of 52% is considered fair.                                                                            Tympanometry:                                           Right ear:       Type 'A' tymp, normal (.97mL) volume     Left ear:          Type 'Ad/C' tymp, normal hypercompliant (4.45mL) mobility with negative (-192daPa) pressure                                           Interpretations:      Behavioral test findings indicate no significant changes in hearing since previous hearing evaluation. Speech reception thresholds obtained at 65dB, AS, and is in agreement with puretone findings. Speech discrimination scores of 52%, AS, is considered fair.  Immittance measures indicate absent/abnormal TM mobility for the right ear and negative pressure with hyper-mobile TM mobility for the left ear.            Recommendations:   1.Continue with ENT follow up  2. Binaural amplification (Information on [place] given)

## 2025-07-29 NOTE — PROGRESS NOTES
[facility] Otolaryngology   Clinic Note    Eleazar Mark  Encounter Date: 7/29/2025   YOB: 1955  Physician: Ana Brown    CC: hearing loss, otorrhea     HPI: Eleazar Mark is a 70 y.o. male who presents with chronic otorrhea. About 10 years ago, his doctor noted incidental R-sided TM perforation. Then about 2 years ago, patient experienced progressive hearing loss. Recent audiogram not available for review. He did receive hearing aids but felt like they did not help much. In October 2024, patient presented to Dr. Coulter with R-sided otorrhea. He was placed on otic and PO antibiotics and referred to our clinic for further management. His infection did not clear after completing medications, and he's had intermittent purulent otorrhea for the past few months. CT temporal bone on 10/8 confirmed R-sided middle ear/mastoid effusion with apparent erosion of incus and stapes. Patient denies otalgia or dizziness. No issues with the L ear. No history of recurrent ear infections as a child. No history of ear tubes or other H&N surgery. Unrelated, he complains of clear drainage from his nose that has not resolved with multiple nasal sprays (although unsure which ones he's tried).     2/18/25: Patient presents for follow up. Audiogram today shows bilateral severe to profound hearing loss. Had trouble getting ciprodex drops approved by insurance so has only used for 5 days. Still with purulence drainage on the R side.     3/13/25: Patient presents for follow up. Otorrhea resolved about 2 days after his last visit. Completed 10-day course of Ciprodex drops and following dry ear precautions.     4/15/2025:   Patient presents today for follow-up for his right ear.  Since his last appointment the patient has redeveloped intermittent drainage from his right ear without any associated pain, dizziness, fever, or headaches.  Review of his repeat CT scan of the temporal bones done on 3/21/2025, shows persistent  "opacification of the epitympanum and aditus ad antrum and the upper middle ear space with thickening of the tympanic membrane.  The incus and malleus are intact but who is difficult to determine the location of the stapes.  Results were discussed with the patient and his wife.    5/5/25 surgery:  Findings  Right TM with large central perforation (40%)  Middle ear mucosa thickened with signs of chronic inflammation  Umbo and distal handle of malleus eroded, incus present and in continuity with head of malleus    5/13/25:  Here today for post-op visit s/p tympanoplasty with atticotomy on 5/5/25.  He is doing well from surgery.  Has been using ear drops twice daily.  Keeping ear dry.    5/29/25: Returns today for 2nd postop.  Patient reports that ear has been draining less he is still using the drops reports his hearing is on any better no other complaints    7/29/25: Patient presents for follow up. Audiogram this morning with no significant improvements since surgery. Patient reports some pain/pressure sensation in the R (operative) ear since Saturday. Associated with his sinuses "acting up" described as mostly nasal congestion. Uses saline nasal spray daily. Never tried Flonase. Has been compliant with ear drops and ointment with minimal drainage.       ROS: Negative except as stated in HPI.      Review of patient's allergies indicates:   Allergen Reactions    Cipro [ciprofloxacin hcl]     Penicillins        Past Medical History:   Diagnosis Date    Colon cancer     Essential (primary) hypertension     Heart disease     History of coronary artery disease status post placement of for coronary artery stents in the early 2000's..    Mixed hyperlipidemia        Past Surgical History:   Procedure Laterality Date    CARDIAC DEFIBRILLATOR PLACEMENT N/A     3 defibrillators placed in the past    COLON RESECTION      HAND SURGERY Right     TYMPANOPLASTY WITH MASTOIDECTOMY Right 5/5/2025    Procedure: TYMPANOPLASTY, WITH " MASTOIDECTOMY;  Surgeon: aLlo Richardson MD;  Location: [facility];  Service: ENT;  Laterality: Right;  Patient has defibrillator       Social History     Socioeconomic History    Marital status:    Tobacco Use    Smoking status: Former     Current packs/day: 0.00     Types: Cigarettes     Quit date:      Years since quittin.6    Smokeless tobacco: Never   Substance and Sexual Activity    Alcohol use: Yes     Alcohol/week: 6.0 standard drinks of alcohol     Types: 6 Cans of beer per week    Drug use: Never       Family History   Problem Relation Name Age of Onset    Heart failure Mother      Cancer Father      Cancer Brother         Outpatient Encounter Medications as of 2025   Medication Sig Dispense Refill    aspirin 81 MG Chew Take 81 mg by mouth once daily.      atorvastatin (LIPITOR) 80 MG tablet Take 80 mg by mouth once daily.      cetirizine (ZYRTEC) 10 MG tablet Take 10 mg by mouth once daily.      clindamycin (CLEOCIN T) 1 % lotion apply to underams & right lower back TWICE DAILY until clear      clotrimazole (LOTRIMIN) 1 % Soln Four drops in the affected ear b.i.d. for 14 days. (Patient not taking: Reported on 2025) 10 mL 0    cyanocobalamin (VITAMIN B-12) 1000 MCG tablet take one tablet by mouth EVERY DAY 30 tablet 3    dapagliflozin (FARXIGA) 5 mg Tab tablet Take 5 mg by mouth once daily.      dupilumab (DUPIXENT PEN) 200 mg/1.14 mL PnIj Inject 200 mg into the skin every 14 (fourteen) days.      ENTRESTO  mg per tablet Take 1 tablet by mouth 2 (two) times daily.      fluticasone propionate (FLOVENT DISKUS) 50 mcg/actuation DsDv Inhale into the lungs. Controller (Patient not taking: Reported on 2025)      folic acid (FOLVITE) 1 MG tablet Take 1 tablet (1,000 mcg total) by mouth once daily. 30 tablet 3    hydrOXYzine HCL (ATARAX) 25 MG tablet Take 1 tablet by mouth once daily. (Patient not taking: Reported on 2025)      ipratropium (ATROVENT) 21 mcg (0.03 %) nasal  spray 2 sprays by Each Nostril route 2 (two) times daily. 30 mL 0    isosorbide mononitrate (IMDUR) 30 MG 24 hr tablet Take 30 mg by mouth once daily.      JARDIANCE 10 mg tablet TAKE 1 TABLET DAILY FOR DIABETES      metoprolol succinate (TOPROL-XL) 200 MG 24 hr tablet Take 200 mg by mouth once daily.      montelukast (SINGULAIR) 10 mg tablet Take 10 mg by mouth daily as needed.      nitroGLYCERIN (NITROSTAT) 0.4 MG SL tablet Place 0.4 mg under the tongue as needed.      nystatin-triamcinolone (MYCOLOG II) cream Apply topically 4 (four) times daily. To ear twice weekly. 30 g 1    ofloxacin (FLOXIN) 0.3 % otic solution Place 5 drops into the right ear 2 (two) times daily. 10 mL 0    oxyCODONE (ROXICODONE) 5 MG immediate release tablet Take 1 tablet (5 mg total) by mouth every 4 (four) hours as needed for Pain. (Patient not taking: Reported on 5/29/2025) 18 tablet 0    pantoprazole (PROTONIX) 40 MG tablet Take 40 mg by mouth once daily.      sodium chloride (OCEAN) 0.65 % nasal spray USE ONE SPRAY IN EACH NOSTRIL TWICE DAILY      tacrolimus (PROTOPIC) 0.1 % ointment Apply topically 2 (two) times daily.      TRUE METRIX GLUCOSE TEST STRIP Strp 1 strip.       No facility-administered encounter medications on file as of 7/29/2025.     PHYSICAL EXAM:  Vitals:    07/29/25 0829   BP: 93/60   Pulse: 60   Resp: 18   Temp: 97.8 °F (36.6 °C)     General Appearance: well nourished, well-developed, alert, oriented, in no acute distress  Head/Face: NC, AT  Eyes: PEERLA, EOMI, normal conjunctiva  Ears: Does not hear well at normal conversation volume  AD: external normal, EAC with minimal thick white drainage that was suctioned to reveal TM with graft which appears healthy with central perforation--approximately 20% of the TM and surrounded by granulation tissue  AS: external normal, ear canal normal, TM retracted with inferior myringosclerosis, no middle ear fluid  Nose: septum midline, no inferior turbinate hypertrophy, no  polyps  OC/OP: dentures in place, no oral lesions, FOM and BOT soft  Neck: soft, non-tender, no palpable lymph nodes, thyroid- no nodules or goiter  Neuro: CN II - XII grossly intact  Psychiatric: oriented to time, place and person, no depression, anxiety or agitation      PERTINENT DATA:  Audiogram (7/29/25):      Audiogram (2/18/25):    Test Results:                    Pure Tone Testing:      Right ear:       Severe to profound mixed hearing loss from 250-8kHz. No responses for speech obtained at equipment limitations.     Left ear:          Moderately severe to profound sensorineural hearing loss from 250-8kHz. Speech reception threshold is in agreement with puretone findings.  Discrimination score of 52% is considered fair.                                                                            Tympanometry:                                           Right ear:       Type 'A' tymp, normal (.97mL) volume     Left ear:          Type 'Ad/C' tymp, normal hypercompliant (4.45mL) mobility with negative (-192daPa) pressure                              Interpretations:   Behavioral test findings indicate no significant changes in hearing since previous hearing evaluation. Speech reception thresholds obtained at 65dB, AS, and is in agreement with puretone findings. Speech discrimination scores of 52%, AS, is considered fair.  Immittance measures indicate absent/abnormal TM mobility for the right ear and negative pressure with hyper-mobile TM mobility for the left ear.      Recommendations:   1.Continue with ENT follow up  2. Binaural amplification (Information on [place] given)     Radiology:          ASSESSMENT:  69-year-old male with right-sided chronic otomastoiditis with tympanic membrane perforation and mixed hearing loss.  The patient improves only temporarily when treated with antibiotic eardrops.  Prior culture from the right ear (2/18/2025) grew out Enterococcus faecalis.  Now s/p  tympanoplasty with fascia and cartilage graft with atticotomy on 5/5/25.    Graft healing well with approximately 20% central perforation.     Plan:   -- DC ear drops and ointment; continue dry ear precautions  -- Will add Flonase nasal spray for sinus congestion and ear pressure; continue Zyrtec daily  -- Audiology provided contact information for hearing aids today  -- RTC in 1 month to reassess TM perforation     Ana Brown, PGY4  [employer] [facility]  7/29/2025 8:41 AM

## 2025-09-04 ENCOUNTER — HOSPITAL ENCOUNTER (OUTPATIENT)
Dept: RADIOLOGY | Facility: HOSPITAL | Age: 70
Discharge: HOME | End: 2025-09-04
Attending: FAMILY MEDICINE
Payer: MEDICARE

## 2025-09-04 DIAGNOSIS — M54.50 LUMBAGO: ICD-10-CM

## 2025-09-04 PROCEDURE — 72100 X-RAY EXAM L-S SPINE 2/3 VWS: CPT | Mod: TC

## (undated) DEVICE — SUT VICRYL 3-0 27 SH

## (undated) DEVICE — PROBE SIMULATOR KRAFF

## (undated) DEVICE — SOLIDIFIER BOTTLE 1500CC

## (undated) DEVICE — GLOVE SENSICARE PI GRN 7.5

## (undated) DEVICE — GLOVE SIGNATURE MICRO LTX 6

## (undated) DEVICE — GLOVE SENSICARE NEOPRENE 6.5

## (undated) DEVICE — GOWN POLY REINF BRTH SLV XL

## (undated) DEVICE — SUT 5/0 18IN PLAIN FAST AB

## (undated) DEVICE — TRAY CATH 1-LYR URIMTR 16FR

## (undated) DEVICE — SYR 10CC LUER LOCK

## (undated) DEVICE — DRAIN PENRS STERILE LTX 18X1/2

## (undated) DEVICE — CORD BIPOLAR 12 FOOT

## (undated) DEVICE — SUT VICRYL PLUS 3-0 SH 18IN

## (undated) DEVICE — Device

## (undated) DEVICE — PENCIL ELECSURG ROCKER 15FT

## (undated) DEVICE — SPONGE SURGIFOAM 100 8.5X12X10

## (undated) DEVICE — DRAPE INSTR MAGNETIC 10X16IN

## (undated) DEVICE — ELECTRODE REM PLYHSV RETURN 9

## (undated) DEVICE — ADHESIVE MASTISOL VIAL 48/BX

## (undated) DEVICE — GLOVE SIGNATURE MICRO LTX 5.5

## (undated) DEVICE — WIPE MERCL POLYVIL ACETL 3X3IN

## (undated) DEVICE — STAPLER SKIN ROTATING HEAD

## (undated) DEVICE — DRAPE INCISE IOBAN 2 23X17IN

## (undated) DEVICE — TRAY SKIN SCRUB WET PREMIUM

## (undated) DEVICE — NDL 27G X 1 1/4

## (undated) DEVICE — ELECTRODE NDL EDGE 2 5/6IN

## (undated) DEVICE — GLOVE SIGNATURE ESSNTL LTX 7.5

## (undated) DEVICE — SLEEVE ECLIPSE GOWN STRL 23IN

## (undated) DEVICE — GLOVE SENSICARE NEOPRENE 7

## (undated) DEVICE — SOL NACL IRR 1000ML BTL

## (undated) DEVICE — MANIFOLD 4 PORT

## (undated) DEVICE — CLOSURE SKIN STERI STRIP 1/2X4

## (undated) DEVICE — BLADE SCALP BEAVER 2.5MM ANG

## (undated) DEVICE — SUT MCRYL PLUS 4-0 PS2 27IN

## (undated) DEVICE — CATH IV PROT 14GX11/4

## (undated) DEVICE — DRAPE SURG W/TWL 17 5/8X23

## (undated) DEVICE — KIT DRESS GLASSCK EAR ADLT ST

## (undated) DEVICE — COVER HANDLE LIGHT RIGID

## (undated) DEVICE — SOL NORMAL USPCA 0.9%

## (undated) DEVICE — RUBBERBAND STERILE 3X1/8IN

## (undated) DEVICE — STAPLER SKIN COUNT 35 W STPL

## (undated) DEVICE — PROTECTOR ONE-STEP ARM REG

## (undated) DEVICE — ELECTRODE EMG NEEDLE

## (undated) DEVICE — CONTAINER SPECIMEN OR STER 4OZ

## (undated) DEVICE — GLOVE SIGNATURE MICRO LTX 7.5

## (undated) DEVICE — MARKER WRITESITE SKIN CHLRAPRP

## (undated) DEVICE — SYR 3ML LL 18GA 1.5IN

## (undated) DEVICE — CLIPPER BLADE MOD 4406 (CAREF)

## (undated) DEVICE — BLADE SURG STAINLESS STEEL #15

## (undated) DEVICE — KIT SURGICAL TURNOVER